# Patient Record
Sex: MALE | Race: WHITE | NOT HISPANIC OR LATINO | Employment: FULL TIME | URBAN - METROPOLITAN AREA
[De-identification: names, ages, dates, MRNs, and addresses within clinical notes are randomized per-mention and may not be internally consistent; named-entity substitution may affect disease eponyms.]

---

## 2017-01-30 DIAGNOSIS — E11.9 TYPE 2 DIABETES MELLITUS WITHOUT COMPLICATION (H): Primary | ICD-10-CM

## 2017-03-24 ENCOUNTER — OFFICE VISIT (OUTPATIENT)
Dept: FAMILY MEDICINE | Facility: CLINIC | Age: 32
End: 2017-03-24
Payer: COMMERCIAL

## 2017-03-24 VITALS
HEIGHT: 72 IN | SYSTOLIC BLOOD PRESSURE: 130 MMHG | HEART RATE: 81 BPM | DIASTOLIC BLOOD PRESSURE: 78 MMHG | BODY MASS INDEX: 24.65 KG/M2 | TEMPERATURE: 97.2 F | WEIGHT: 182 LBS

## 2017-03-24 DIAGNOSIS — E11.9 TYPE 2 DIABETES MELLITUS WITHOUT COMPLICATION, WITHOUT LONG-TERM CURRENT USE OF INSULIN (H): Primary | ICD-10-CM

## 2017-03-24 DIAGNOSIS — Z23 NEED FOR VACCINATION: ICD-10-CM

## 2017-03-24 DIAGNOSIS — Z71.84 COUNSELING ABOUT TRAVEL: ICD-10-CM

## 2017-03-24 LAB — HBA1C MFR BLD: 5.3 % (ref 4.3–6)

## 2017-03-24 PROCEDURE — 90471 IMMUNIZATION ADMIN: CPT | Performed by: FAMILY MEDICINE

## 2017-03-24 PROCEDURE — 99214 OFFICE O/P EST MOD 30 MIN: CPT | Mod: 25 | Performed by: FAMILY MEDICINE

## 2017-03-24 PROCEDURE — 36415 COLL VENOUS BLD VENIPUNCTURE: CPT | Performed by: FAMILY MEDICINE

## 2017-03-24 PROCEDURE — 90746 HEPB VACCINE 3 DOSE ADULT IM: CPT | Performed by: FAMILY MEDICINE

## 2017-03-24 PROCEDURE — 90472 IMMUNIZATION ADMIN EACH ADD: CPT | Performed by: FAMILY MEDICINE

## 2017-03-24 PROCEDURE — 90632 HEPA VACCINE ADULT IM: CPT | Performed by: FAMILY MEDICINE

## 2017-03-24 PROCEDURE — 83036 HEMOGLOBIN GLYCOSYLATED A1C: CPT | Performed by: FAMILY MEDICINE

## 2017-03-24 PROCEDURE — 90691 TYPHOID VACCINE IM: CPT | Performed by: FAMILY MEDICINE

## 2017-03-24 NOTE — MR AVS SNAPSHOT
After Visit Summary   3/24/2017    Jean Lamar    MRN: [id number]           Patient Information     Date Of Birth          1985        Visit Information        Provider Department      3/24/2017 9:00 AM Kilo Montes MD [facility]        Today's Diagnoses     Type 2 diabetes mellitus without complication, without long-term current use of insulin (H)    -  1    Counseling about travel        Need for vaccination           Follow-ups after your visit        Follow-up notes from your care team     Return in about 3 months (around 6/24/2017) for Diabetes Recheck.      Who to contact     If you have questions or need follow up information about today's clinic visit or your schedule please contact [facility] directly at [phone].  Normal or non-critical lab and imaging results will be communicated to you by Think Financehart, letter or phone within 4 business days after the clinic has received the results. If you do not hear from us within 7 days, please contact the clinic through Think Financehart or phone. If you have a critical or abnormal lab result, we will notify you by phone as soon as possible.  Submit refill requests through MySiteApp or call your pharmacy and they will forward the refill request to us. Please allow 3 business days for your refill to be completed.          Additional Information About Your Visit        MyChart Information     MySiteApp gives you secure access to your electronic health record. If you see a primary care provider, you can also send messages to your care team and make appointments. If you have questions, please call your primary care clinic.  If you do not have a primary care provider, please call [phone] and they will assist you.        Care EveryWhere ID     This is your Care EveryWhere ID. This could be used by other organizations to access your [place] medical records  [id number]        Your Vitals Were     Pulse Temperature Height BMI  (Body Mass Index)          81 97.2  F (36.2  C) (Oral) 6' (1.829 m) 24.68 kg/m2         Blood Pressure from Last 3 Encounters:   03/24/17 130/78   12/23/16 132/78   09/19/16 124/78    Weight from Last 3 Encounters:   03/24/17 182 lb (82.6 kg)   12/23/16 188 lb (85.3 kg)   09/19/16 200 lb (90.7 kg)              We Performed the Following     Each additional admin.  (Right click and add QUANTITY)  [[id number]]     Hemoglobin A1c     HEPATITIS A VACCINE (ADULT)     HEPATITIS B VACCINE,ADULT,IM     TYPHOID VACCINE, IM          Today's Medication Changes          These changes are accurate as of: 3/24/17  9:49 AM.  If you have any questions, ask your nurse or doctor.               Start taking these medicines.        Dose/Directions    metFORMIN 1000 MG tablet   Commonly known as:  GLUCOPHAGE   Used for:  Type 2 diabetes mellitus without complication, without long-term current use of insulin (H)   Started by:  Kilo Montes MD        Dose:  500 mg   Take 0.5 tablets (500 mg) by mouth 2 times daily (with meals)   Quantity:  90 tablet   Refills:  0         Stop taking these medicines if you haven't already. Please contact your care team if you have questions.     glimepiride 2 MG tablet   Commonly known as:  AMARYL   Stopped by:  Kilo Montes MD                Where to get your medicines      Some of these will need a paper prescription and others can be bought over the counter.  Ask your nurse if you have questions.     You don't need a prescription for these medications     metFORMIN 1000 MG tablet                Primary Care Provider Office Phone # Fax #    [facility] [phone] [facility] [address]        Thank you!     Thank you for choosing [facility]  for your care. Our goal is always to provide you with excellent care. Hearing back from our patients is one way we can continue to improve our services. Please take a few minutes to complete the  written survey that you may receive in the mail after your visit with us. Thank you!             Your Updated Medication List - Protect others around you: Learn how to safely use, store and throw away your medicines at www.disposemymeds.org.          This list is accurate as of: 3/24/17  9:49 AM.  Always use your most recent med list.                   Brand Name Dispense Instructions for use    ASPIRIN NOT PRESCRIBED    INTENTIONAL     1 each continuous prn for other Antiplatelet medication not prescribed intentionally due to not indicated       metFORMIN 1000 MG tablet    GLUCOPHAGE    90 tablet    Take 0.5 tablets (500 mg) by mouth 2 times daily (with meals)       STATIN NOT PRESCRIBED (INTENTIONAL)      continuous prn for other Statin not prescribed intentionally due to {CHOOSE REASON BEFORE SIGNING!!!:675813}

## 2017-03-24 NOTE — NURSING NOTE
Chief Complaint   Patient presents with     Diabetes       Initial /78 (BP Location: Right arm, Cuff Size: Adult Regular)  Pulse 81  Temp 97.2  F (36.2  C) (Oral)  Ht 6' (1.829 m)  Wt 182 lb (82.6 kg)  BMI 24.68 kg/m2 Estimated body mass index is 24.68 kg/(m^2) as calculated from the following:    Height as of this encounter: 6' (1.829 m).    Weight as of this encounter: 182 lb (82.6 kg).  Medication Reconciliation: sandrine Aaron M.A.

## 2017-03-24 NOTE — PROGRESS NOTES
To access diabetes educational materials with in EPIC use <dot>[email or web address]      Put this in AFTER VISIT SUMMARY if needed for the patient to access information online [email or web address]      SUBJECTIVE:  Jean Lamar is a 31 year old male who presents today for a follow up appointment for management of DIABETES MELLITUS.    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   Results as follows: fasting glucose-  and bedtime-     The patient reports that he IS taking the medication as prescribed. He denies side effects of medication.    ----------------------------------------------------------------------------------------------------------------------------------------    BP Readings from Last 3 Encounters:   03/24/17 130/78   12/23/16 132/78   09/19/16 124/78     The patient reports that he IS NOT currently smoking.  History   Smoking Status     Never Smoker   Smokeless Tobacco     Not on file           The patient reports that he IS NOT taking a statin.   (Reminder all diabetics with a ASCVD risk greater or equal to 7.5% should be on a high intensity statin, otherwise on a moderate intensity statin)  he is not taking a statin because of his age.       The patient reports that he IS NOT taking  aspirin daily.  (Reminder all diabetic patients with a cardiovascular risk factor and > 50 should take a daily aspirin)   he is not taking aspirin because of his age.      The patient reports that he IS doing a self foot exam weekly.  The patient reports that he does exercise in the form of walking 3-4 times a week for about 30 minutes .  The patient reports that he IS following the recommended diabetic diet. He  would give himself a B grade  on his diet.  The patient reports that his last eye exam was 9 months ago.         Immunization History   Administered Date(s) Administered     Hepatitis B 06/10/2016, 12/23/2016     Influenza Vaccine IM 3yrs+ 4 Valent IIV4 12/23/2016     Pneumococcal 23 valent 05/23/2016     TD (ADULT, 7+) 05/23/2016     The patient reports that he has started the hepatitis B vaccine series in the past. (recommended for age 19-59 and can be given to age 60 or older)  The patient reports that he has had a pnuemovax in the past.  The patient reports that he has had a flu shot for the current influenza season.  The patient would like to have a Hepatitis A, Hepatitis B and Typhoid as he is traveling to [facility]        EXAM:  /78 (BP Location: Right arm, Cuff Size: Adult Regular)  Pulse 81  Temp 97.2  F (36.2  C) (Oral)  Ht 6' (1.829 m)  Wt 182 lb (82.6 kg)  BMI 24.68 kg/m2  Wt Readings from Last 4 Encounters:   03/24/17 182 lb (82.6 kg)   12/23/16 188 lb (85.3 kg)   09/19/16 200 lb (90.7 kg)   07/21/16 214 lb 3.2 oz (97.2 kg)     Body mass index is 24.68 kg/(m^2).    General:  Jean is awake, alert, and cooperative.  NAD.                                             Foot Exam: Areas of numbess as outlined above  Right Foot none  Left Foot none  normal DP pulses, capillary refill less than 2 seconds, no trophic changes or ulcerative lesions, dry skin globally on the feet and normal monofilament exam, except for findings noted on diabetic foot graphical image.                Previsit labs drawn include:  Office Visit on 12/23/2016   Component Date Value Ref Range Status     Cholesterol 12/23/2016 127  <200 mg/dL Final     Triglycerides 12/23/2016 55  <150 mg/dL Final    Fasting specimen     HDL Cholesterol 12/23/2016 43  >39 mg/dL Final     LDL Cholesterol Calculated 12/23/2016 73  <100 mg/dL Final    Desirable:       <100 mg/dl     Non HDL Cholesterol 12/23/2016 84  <130 mg/dL Final     Hemoglobin A1C 12/23/2016 4.7  4.3 -  "6.0 % Final     ASSESSMENT and PLAN:    Type II Diabetes, Unchanged  Stable off of amaryl    DIABETES Type II:          Results for orders placed or performed in visit on 03/24/17   Hemoglobin A1c   Result Value Ref Range    Hemoglobin A1C 5.3 4.3 - 6.0 %                      Lab Results   Component Value Date    A1C 4.7 12/23/2016       Control   good  Lab Results   Component Value Date    A1C 4.7 12/23/2016    A1C 5.2 09/18/2016    A1C 13.3 06/07/2016     Lab Results   Component Value Date    MICROL 24 06/07/2016     No results found for: MICROALBUMIN Control   good    Recommended to take ASA  mg daily for appropriate patient, Decrease the dose of Metformin to 500 mg 2 time(s) day, Compliance with the recommended diabetic diet was stressed, Regular aerobic exercise was encouraged, Home glucose monitoring encouraged, Annual eye exam recommended, Self foot exam demonstrated and recommended to be done nightly, Apply moisturizer to feet with in 3 minutes of showering or bathing, Follow up in clinic in 3 months for a diabetes check and Future labs ordered and the patient was instructed to make a lab appt 1 week prior to next diabetes visit      BP/HTN:   BP Readings from Last 3 Encounters:   03/24/17 130/78   12/23/16 132/78   09/19/16 124/78     Control   good    - Medication:continue the current doses of medication.  (make sure to order \"Ace not prescribed intentionally if not on an ACE/ARB)  The patient was advised to do the following therapuetic life style changes  - Dietary sodium restriction and increase potassium and Calcium intake  - Regular aerobic exercise  - Weight loss  - Discontinue smoking if applicable  - Avoid regular NSAID use if applicable  - Avoid regular decongestant use if applicable  - Follow up in clinic in 6 months for a recheck  - Check a basic metabolic panel today if needed    Patient Education: Reviewed risks of hypertension and principles of   Treatment.    HYPERCHOLESTEROLEMIA:   "   The ASCVD Risk score (Bradley AALIYAH Dang, et al., 2013) failed to calculate for the following reasons:    The 2013 ASCVD risk score is only valid for ages 40 to 79    Lab Results   Component Value Date    LDL 73 12/23/2016      Control   good  Cholesterol   Date Value Ref Range Status   12/23/2016 127 <200 mg/dL Final   09/18/2016 136 <200 mg/dL Final     HDL Cholesterol   Date Value Ref Range Status   12/23/2016 43 >39 mg/dL Final   09/18/2016 38 (L) >39 mg/dL Final     LDL Cholesterol Calculated   Date Value Ref Range Status   12/23/2016 73 <100 mg/dL Final     Comment:     Desirable:       <100 mg/dl   09/18/2016 80 <100 mg/dL Final     Comment:     Desirable:       <100 mg/dl     Triglycerides   Date Value Ref Range Status   12/23/2016 55 <150 mg/dL Final     Comment:     Fasting specimen   09/18/2016 89 <150 mg/dL Final     Comment:     Fasting specimen     No results found for: CHOLHDLRATIO        We have discussed the results and we will continue the current management.       Screening Questionnaire for Adult Immunization    Are you sick today?   No   Do you have allergies to medications, food, a vaccine component or latex?   Yes   Have you ever had a serious reaction after receiving a vaccination?   No   Do you have a long-term health problem with heart disease, lung disease, asthma, kidney disease, metabolic disease (e.g. diabetes), anemia, or other blood disorder?   Yes   Do you have cancer, leukemia, HIV/AIDS, or any other immune system problem?   No   In the past 3 months, have you taken medications that affect  your immune system, such as prednisone, other steroids, or anticancer drugs; drugs for the treatment of rheumatoid arthritis, Crohn s disease, or psoriasis; or have you had radiation treatments?   No   Have you had a seizure, or a brain or other nervous system problem?   No   During the past year, have you received a transfusion of blood or blood     products, or been given immune (gamma) globulin or  antiviral drug?   No   For women: Are you pregnant or is there a chance you could become        pregnant during the next month?   No   Have you received any vaccinations in the past 4 weeks?   No     Immunization questionnaire was positive for at least one answer.  Notified Dr. Montes.      [facility]    Screening performed by Helen Aaron on 3/24/2017 at 9:50 AM.

## 2017-06-20 ENCOUNTER — OFFICE VISIT (OUTPATIENT)
Dept: FAMILY MEDICINE | Facility: CLINIC | Age: 32
End: 2017-06-20
Payer: COMMERCIAL

## 2017-06-20 VITALS
HEIGHT: 72 IN | WEIGHT: 193 LBS | SYSTOLIC BLOOD PRESSURE: 110 MMHG | BODY MASS INDEX: 26.14 KG/M2 | HEART RATE: 88 BPM | DIASTOLIC BLOOD PRESSURE: 76 MMHG | TEMPERATURE: 97.1 F

## 2017-06-20 DIAGNOSIS — E11.9 TYPE 2 DIABETES MELLITUS WITHOUT COMPLICATION, WITHOUT LONG-TERM CURRENT USE OF INSULIN (H): ICD-10-CM

## 2017-06-20 LAB
CHOLEST SERPL-MCNC: 159 MG/DL
CREAT UR-MCNC: 474 MG/DL
HBA1C MFR BLD: 5.3 % (ref 4.3–6)
HDLC SERPL-MCNC: 53 MG/DL
LDLC SERPL CALC-MCNC: 93 MG/DL
MICROALBUMIN UR-MCNC: 28 MG/L
MICROALBUMIN/CREAT UR: 5.8 MG/G CR (ref 0–17)
NONHDLC SERPL-MCNC: 106 MG/DL
TRIGL SERPL-MCNC: 67 MG/DL

## 2017-06-20 PROCEDURE — 83036 HEMOGLOBIN GLYCOSYLATED A1C: CPT | Performed by: FAMILY MEDICINE

## 2017-06-20 PROCEDURE — 99214 OFFICE O/P EST MOD 30 MIN: CPT | Performed by: FAMILY MEDICINE

## 2017-06-20 PROCEDURE — 80061 LIPID PANEL: CPT | Performed by: FAMILY MEDICINE

## 2017-06-20 PROCEDURE — 36415 COLL VENOUS BLD VENIPUNCTURE: CPT | Performed by: FAMILY MEDICINE

## 2017-06-20 PROCEDURE — 82043 UR ALBUMIN QUANTITATIVE: CPT | Performed by: FAMILY MEDICINE

## 2017-06-20 NOTE — NURSING NOTE
Chief Complaint   Patient presents with     Diabetes       Initial /73  Pulse 88  Temp 97.1  F (36.2  C) (Oral)  Ht 6' (1.829 m)  Wt 193 lb (87.5 kg)  BMI 26.18 kg/m2 Estimated body mass index is 26.18 kg/(m^2) as calculated from the following:    Height as of this encounter: 6' (1.829 m).    Weight as of this encounter: 193 lb (87.5 kg).  Medication Reconciliation: complete   Helen Aaron M.A.

## 2017-06-20 NOTE — MR AVS SNAPSHOT
After Visit Summary   6/20/2017    Jean Lamar    MRN: [id number]           Patient Information     Date Of Birth          1985        Visit Information        Provider Department      6/20/2017 10:15 AM Kilo Montes MD [facility]        Today's Diagnoses     Type 2 diabetes mellitus without complication, without long-term current use of insulin (H)          Care Instructions    Please schedule and eye exam with you eye care provider and continue to do so every 1 year(s).      RETURN TO CLINIC FOR the Hepatitis A vaccine on 9-24-17 or after          Follow-ups after your visit        Follow-up notes from your care team     Return in about 3 months (around 9/20/2017) for Diabetes Recheck.      Who to contact     If you have questions or need follow up information about today's clinic visit or your schedule please contact [facility] directly at [phone].  Normal or non-critical lab and imaging results will be communicated to you by MyChart, letter or phone within 4 business days after the clinic has received the results. If you do not hear from us within 7 days, please contact the clinic through GoPlanithart or phone. If you have a critical or abnormal lab result, we will notify you by phone as soon as possible.  Submit refill requests through enercast or call your pharmacy and they will forward the refill request to us. Please allow 3 business days for your refill to be completed.          Additional Information About Your Visit        MyChart Information     enercast gives you secure access to your electronic health record. If you see a primary care provider, you can also send messages to your care team and make appointments. If you have questions, please call your primary care clinic.  If you do not have a primary care provider, please call [phone] and they will assist you.        Care EveryWhere ID     This is your Care EveryWhere ID. This could be used by  other organizations to access your [place] medical records  [id number]        Your Vitals Were     Pulse Temperature Height BMI (Body Mass Index)          88 97.1  F (36.2  C) (Oral) 6' (1.829 m) 26.18 kg/m2         Blood Pressure from Last 3 Encounters:   06/20/17 110/76   03/24/17 130/78   12/23/16 132/78    Weight from Last 3 Encounters:   06/20/17 193 lb (87.5 kg)   03/24/17 182 lb (82.6 kg)   12/23/16 188 lb (85.3 kg)              We Performed the Following     Albumin Random Urine Quantitative     Hemoglobin A1c     Lipid panel reflex to direct LDL          Today's Medication Changes          These changes are accurate as of: 6/20/17 11:01 AM.  If you have any questions, ask your nurse or doctor.               Stop taking these medicines if you haven't already. Please contact your care team if you have questions.     metFORMIN 1000 MG tablet   Commonly known as:  GLUCOPHAGE   Stopped by:  Kilo Montes MD                    Primary Care Provider Office Phone # Fax #    Kilo Montes [phone]       [facility] [id number] [place] [id number]        Thank you!     Thank you for choosing [facility]  for your care. Our goal is always to provide you with excellent care. Hearing back from our patients is one way we can continue to improve our services. Please take a few minutes to complete the written survey that you may receive in the mail after your visit with us. Thank you!             Your Updated Medication List - Protect others around you: Learn how to safely use, store and throw away your medicines at www.disposemymeds.org.          This list is accurate as of: 6/20/17 11:01 AM.  Always use your most recent med list.                   Brand Name Dispense Instructions for use    ASPIRIN NOT PRESCRIBED    INTENTIONAL     1 each continuous prn for other Antiplatelet medication not prescribed intentionally due to not indicated       STATIN NOT  PRESCRIBED (INTENTIONAL)      continuous prn for other Statin not prescribed intentionally due to {CHOOSE REASON BEFORE SIGNING!!!:426336}

## 2017-06-20 NOTE — PATIENT INSTRUCTIONS
Please schedule and eye exam with you eye care provider and continue to do so every 1 year(s).      RETURN TO CLINIC FOR the Hepatitis A vaccine on 9-24-17 or after

## 2017-06-20 NOTE — PROGRESS NOTES
To access diabetes educational materials with in EPIC use <dot>[email or web address]      Put this in AFTER VISIT SUMMARY if needed for the patient to access information online [email or web address]      SUBJECTIVE:  Jean Lamar is a 32 year old male who presents today for a follow up appointment for management of DIABETES MELLITUS.    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   Results as follows: fasting glucose-  and bedtime-     The patient reports that he IS taking the medication as prescribed. He denies side effects of medication.    ----------------------------------------------------------------------------------------------------------------------------------------    BP Readings from Last 3 Encounters:   06/20/17 110/76   03/24/17 130/78   12/23/16 132/78     The patient reports that he IS NOT currently smoking.  History   Smoking Status     Never Smoker   Smokeless Tobacco     Not on file           The ASCVD Risk score (Warren DC Jr, et al., 2013) failed to calculate for the following reasons:    The 2013 ASCVD risk score is only valid for ages 40 to 79          The patient reports that he IS NOT taking a statin.   (Reminder all diabetics with a ASCVD risk greater or equal to 7.5% should be on a high intensity statin, otherwise on a moderate intensity statin)      The patient reports that he IS NOT taking  aspirin daily.  (Reminder all diabetic patients with a cardiovascular risk factor and > 50 should take a daily aspirin)     The patient reports that he IS doing a self foot exam weekly.  The patient reports that he does exercise in the form of walking 3 times a week for about 10-15  minutes .  The patient reports that he IS  following the recommended diabetic diet. He  would give himself a B grade on his diet.  The patient reports that his last eye exam was 12 months ago.       Immunization History   Administered Date(s) Administered     Hepatitis A (Adult) 03/24/2017     Hepatitis B 06/10/2016, 12/23/2016, 03/24/2017     Influenza Vaccine IM 3yrs+ 4 Valent IIV4 12/23/2016     Pneumococcal 23 valent 05/23/2016     TD (ADULT, 7+) 05/23/2016     Typhoid IM 03/24/2017     The patient reports that he has had the hepatitis B vaccine series in the past. (recommended for age 19-59 and can be given to age 60 or older)  The patient reports that he has had a pnuemovax in the past.  The patient reports that he has had a flu shot for the current influenza season.  The patient would like to have a no vaccinations today  Hep A can be given 6 month(s) from 3-24-17    Patient concerns: has no specific complaints and has been feeling well.        EXAM:  /76  Pulse 88  Temp 97.1  F (36.2  C) (Oral)  Ht 6' (1.829 m)  Wt 193 lb (87.5 kg)  BMI 26.18 kg/m2  Wt Readings from Last 4 Encounters:   06/20/17 193 lb (87.5 kg)   03/24/17 182 lb (82.6 kg)   12/23/16 188 lb (85.3 kg)   09/19/16 200 lb (90.7 kg)     Body mass index is 26.18 kg/(m^2).    General:  Jean is awake, alert, and cooperative.  NAD.        Diabetic Foot Screen:  Any complaints of increased pain or numbness ? No  Is there a foot ulcer now or a history of foot ulcer? No  Does the foot have an abnormal shape? No  Are the nails thick, too long or ingrown? No  Are there any redness or open areas? No         Sensation Testing done at all points on the diagram with monofilament     Right Foot: Sensation Normal at all points  Left Foot: Sensation Normal at all points     Risk Category: 0- No loss of protective sensation  Performed by Kilo Montes MD                  Previsit labs drawn include:  Office Visit on 03/24/2017   Component Date Value Ref Range Status     Hemoglobin A1C  "03/24/2017 5.3  4.3 - 6.0 % Final         ASSESSMENT and PLAN:    Type II Diabetes, Improving as he is on a lower dose of the metformin since his last hemoglobin A1C   DIABETES Type II:             Results for orders placed or performed in visit on 06/20/17   Hemoglobin A1c   Result Value Ref Range    Hemoglobin A1C 5.3 4.3 - 6.0 %                     Lab Results   Component Value Date    A1C 5.3 03/24/2017         Lab Results   Component Value Date    A1C 5.3 03/24/2017    A1C 4.7 12/23/2016    A1C 5.2 09/18/2016     Lab Results   Component Value Date    MICROL 24 06/07/2016     No results found for: MICROALBUMIN Control   good    Discontinue the metformin, Compliance with the recommended diabetic diet was stressed, Regular aerobic exercise was encouraged, Home glucose monitoring encouraged, Annual eye exam recommended, Self foot exam demonstrated and recommended to be done nightly, Apply moisturizer to feet with in 3 minutes of showering or bathing, Follow up in clinic in 3 months for a diabetes check and Future labs ordered and the patient was instructed to make a lab appt 1 week prior to next diabetes visit      BP/HTN:   BP Readings from Last 3 Encounters:   06/20/17 110/76   03/24/17 130/78   12/23/16 132/78     Control   good    - Medication:continue the current doses of medication.  (make sure to order \"Ace not prescribed intentionally if not on an ACE/ARB)  The patient was advised to do the following therapuetic life style changes  - Dietary sodium restriction and increase potassium and Calcium intake  - Regular aerobic exercise  - Weight loss  - Discontinue smoking if applicable  - Avoid regular NSAID use if applicable  - Avoid regular decongestant use if applicable  - Follow up in clinic in 6 months for a recheck  - Check a basic metabolic panel today if needed    Patient Education: Reviewed risks of hypertension and principles of   Treatment.    HYPERCHOLESTEROLEMIA:     The ASCVD Risk score (Edin FISCHER Jr, " et al., 2013) failed to calculate for the following reasons:    The 2013 ASCVD risk score is only valid for ages 40 to 79    Lab Results   Component Value Date    LDL 73 12/23/2016      Control   good  Cholesterol   Date Value Ref Range Status   12/23/2016 127 <200 mg/dL Final   09/18/2016 136 <200 mg/dL Final     HDL Cholesterol   Date Value Ref Range Status   12/23/2016 43 >39 mg/dL Final   09/18/2016 38 (L) >39 mg/dL Final     LDL Cholesterol Calculated   Date Value Ref Range Status   12/23/2016 73 <100 mg/dL Final     Comment:     Desirable:       <100 mg/dl   09/18/2016 80 <100 mg/dL Final     Comment:     Desirable:       <100 mg/dl     Triglycerides   Date Value Ref Range Status   12/23/2016 55 <150 mg/dL Final     Comment:     Fasting specimen   09/18/2016 89 <150 mg/dL Final     Comment:     Fasting specimen     No results found for: CHOLHDLRATIO      The patient is not on a statin because of his age.         The patient is fasting and we will recheck the fasting lipid panel .

## 2017-06-21 NOTE — PROGRESS NOTES
Jean,  I have reviewed the results of the laboratory tests that we recently ordered. All of the lab work performed was normal or considered normal for you.  Sincerely,   Kilo Montes

## 2017-07-14 ENCOUNTER — TELEPHONE (OUTPATIENT)
Dept: FAMILY MEDICINE | Facility: CLINIC | Age: 32
End: 2017-07-14

## 2017-07-14 DIAGNOSIS — E11.9 TYPE 2 DIABETES MELLITUS WITHOUT COMPLICATION, WITHOUT LONG-TERM CURRENT USE OF INSULIN (H): Primary | ICD-10-CM

## 2017-07-14 NOTE — TELEPHONE ENCOUNTER
Per 6/20/17 office notes with PCP, patient has been ordered to check BS twice daily:        The patient checks his blood sugar as follows: two times daily, once daily.   Results as follows: fasting glucose-  and bedtime-        Rx refilled per Richelle RN refill protocol.    Mandy Ahn RN

## 2017-07-14 NOTE — TELEPHONE ENCOUNTER
Patient needs his Contour test strips refilled. Qty 100 testing 4xs daily. He would like 90 day refill. Ok to leave a message, as he is a  and may not be able to answer.

## 2017-07-17 ENCOUNTER — TELEPHONE (OUTPATIENT)
Dept: FAMILY MEDICINE | Facility: CLINIC | Age: 32
End: 2017-07-17

## 2017-07-17 NOTE — TELEPHONE ENCOUNTER
Pt. Is still waiting on a response for a request for his diabetic test strips. [facility] says they have sent over many requested via fax and phone. Please phone him to let him know what can be done.    Thank you

## 2017-07-17 NOTE — TELEPHONE ENCOUNTER
Per pharmacy tech (female)  His test strips have been ready for him since Friday.  I did call patient and let him know this.  Amber Palumbo RN

## 2017-09-29 ENCOUNTER — OFFICE VISIT (OUTPATIENT)
Dept: FAMILY MEDICINE | Facility: CLINIC | Age: 32
End: 2017-09-29
Payer: COMMERCIAL

## 2017-09-29 VITALS
HEART RATE: 92 BPM | DIASTOLIC BLOOD PRESSURE: 78 MMHG | BODY MASS INDEX: 26.99 KG/M2 | SYSTOLIC BLOOD PRESSURE: 115 MMHG | WEIGHT: 199 LBS | TEMPERATURE: 98.2 F

## 2017-09-29 DIAGNOSIS — E78.6 HDL DEFICIENCY: ICD-10-CM

## 2017-09-29 DIAGNOSIS — Z23 NEED FOR VACCINATION: ICD-10-CM

## 2017-09-29 DIAGNOSIS — E11.9 TYPE 2 DIABETES MELLITUS WITHOUT COMPLICATION, WITHOUT LONG-TERM CURRENT USE OF INSULIN (H): Primary | ICD-10-CM

## 2017-09-29 DIAGNOSIS — E11.9 TYPE 2 DIABETES MELLITUS WITHOUT COMPLICATION, WITHOUT LONG-TERM CURRENT USE OF INSULIN (H): ICD-10-CM

## 2017-09-29 DIAGNOSIS — Z23 NEED FOR PROPHYLACTIC VACCINATION AND INOCULATION AGAINST INFLUENZA: Primary | ICD-10-CM

## 2017-09-29 LAB
ALBUMIN SERPL-MCNC: 4.1 G/DL (ref 3.4–5)
ALP SERPL-CCNC: 60 U/L (ref 40–150)
ALT SERPL W P-5'-P-CCNC: 25 U/L (ref 0–70)
ANION GAP SERPL CALCULATED.3IONS-SCNC: 7 MMOL/L (ref 3–14)
AST SERPL W P-5'-P-CCNC: 11 U/L (ref 0–45)
BILIRUB SERPL-MCNC: 0.6 MG/DL (ref 0.2–1.3)
BUN SERPL-MCNC: 14 MG/DL (ref 7–30)
CALCIUM SERPL-MCNC: 9.2 MG/DL (ref 8.5–10.1)
CHLORIDE SERPL-SCNC: 105 MMOL/L (ref 94–109)
CHOLEST SERPL-MCNC: 121 MG/DL
CO2 SERPL-SCNC: 29 MMOL/L (ref 20–32)
CREAT SERPL-MCNC: 1.17 MG/DL (ref 0.66–1.25)
GFR SERPL CREATININE-BSD FRML MDRD: 71 ML/MIN/1.7M2
GLUCOSE SERPL-MCNC: 99 MG/DL (ref 70–99)
HBA1C MFR BLD: 5.4 % (ref 4.3–6)
HDLC SERPL-MCNC: 46 MG/DL
LDLC SERPL CALC-MCNC: 65 MG/DL
NONHDLC SERPL-MCNC: 75 MG/DL
POTASSIUM SERPL-SCNC: 4.3 MMOL/L (ref 3.4–5.3)
PROT SERPL-MCNC: 7.3 G/DL (ref 6.8–8.8)
SODIUM SERPL-SCNC: 141 MMOL/L (ref 133–144)
TRIGL SERPL-MCNC: 49 MG/DL

## 2017-09-29 PROCEDURE — 90632 HEPA VACCINE ADULT IM: CPT | Performed by: FAMILY MEDICINE

## 2017-09-29 PROCEDURE — 80053 COMPREHEN METABOLIC PANEL: CPT | Performed by: FAMILY MEDICINE

## 2017-09-29 PROCEDURE — 80061 LIPID PANEL: CPT | Performed by: FAMILY MEDICINE

## 2017-09-29 PROCEDURE — 83036 HEMOGLOBIN GLYCOSYLATED A1C: CPT | Performed by: FAMILY MEDICINE

## 2017-09-29 PROCEDURE — 36415 COLL VENOUS BLD VENIPUNCTURE: CPT | Performed by: FAMILY MEDICINE

## 2017-09-29 PROCEDURE — 90472 IMMUNIZATION ADMIN EACH ADD: CPT | Performed by: FAMILY MEDICINE

## 2017-09-29 PROCEDURE — 90471 IMMUNIZATION ADMIN: CPT | Performed by: FAMILY MEDICINE

## 2017-09-29 PROCEDURE — 99214 OFFICE O/P EST MOD 30 MIN: CPT | Mod: 25 | Performed by: FAMILY MEDICINE

## 2017-09-29 PROCEDURE — 90686 IIV4 VACC NO PRSV 0.5 ML IM: CPT | Performed by: FAMILY MEDICINE

## 2017-09-29 RX ORDER — FEXOFENADINE HCL 180 MG/1
180 TABLET ORAL DAILY
COMMUNITY

## 2017-09-29 NOTE — PROGRESS NOTES
"Jean,  I have reviewed the results of the laboratory tests that we recently ordered. All of the lab work performed was normal or considered normal for you. Your cholesterol looks great so there is no need to start a \"statin\" at this point.  Sincerely,   Kilo Montes    "

## 2017-09-29 NOTE — PROGRESS NOTES
Injectable Influenza Immunization Documentation    1.  Is the person to be vaccinated sick today?   No    2. Does the person to be vaccinated have an allergy to a component   of the vaccine?   No    3. Has the person to be vaccinated ever had a serious reaction   to influenza vaccine in the past?   No    4. Has the person to be vaccinated ever had Guillain-Barré syndrome?   No    Form completed by Helen Aaron M.A.  --------------------------------------------------------------------------------------------------------------------------------------  To access diabetes educational materials with in EPIC use <dot>[email or web address]      Put this in AFTER VISIT SUMMARY if needed for the patient to access information online [email or web address]      SUBJECTIVE:  Jean Lamar is a 32 year old male who presents today for a follow up appointment for management of DIABETES MELLITUS.    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   Results as follows: fasting glucose-  and bedtime-     The patient reports that he IS taking the medication as prescribed. N/a  ----------------------------------------------------------------------------------------------------------------------------------------    BP Readings from Last 3 Encounters:   09/29/17 115/78   06/20/17 110/76   03/24/17 130/78     The patient reports that he IS NOT currently smoking.  History   Smoking Status     Never Smoker   Smokeless Tobacco     Never Used         The ASCVD Risk score (High Fallsshelley FISCHER Jr, et al., 2013) failed to calculate for the following reasons:    The 2013 ASCVD risk score is only valid for ages 40 to 79            The patient reports that he IS NOT taking a statin.    (Reminder all diabetics with a ASCVD risk greater or equal to 7.5% should be on a high intensity statin, otherwise on a moderate intensity statin)    The patient reports that he IS not taking  aspirin daily.  (Reminder all diabetic patients with a cardiovascular risk factor and > 50 should take a daily aspirin)   he is not taking aspirin because it is not indicated at his age.  (Reminder to intentionally not prescribe aspirin in )    The patient reports that he IS doing a self foot exam weekly.  The patient reports that he does exercise occasionally.   The patient reports that he IS following the recommended diabetic diet.   The patient reports that his last eye exam was 15 months ago.       Immunization History   Administered Date(s) Administered     HepA-Adult 03/24/2017     HepB 06/10/2016, 12/23/2016, 03/24/2017     Influenza Vaccine IM 3yrs+ 4 Valent IIV4 12/23/2016     Pneumococcal 23 valent 05/23/2016     TD (ADULT, 7+) 05/23/2016     Typhoid IM 03/24/2017     The patient reports that he has had the hepatitis B vaccine series in the past. (recommended for age 19-59 and can be given to age 60 or older)  The patient reports that he has had a pnuemovax in the past.  The patient reports that he has not had a flu shot for the current influenza season.  The patient would like to have a Hepatitis A #2 and Influenza    Patient concerns: has no specific complaints and has been feeling well.      EXAM:  /78  Pulse 92  Temp 98.2  F (36.8  C) (Oral)  Wt 199 lb (90.3 kg)  BMI 26.99 kg/m2  Wt Readings from Last 4 Encounters:   09/29/17 199 lb (90.3 kg)   06/20/17 193 lb (87.5 kg)   03/24/17 182 lb (82.6 kg)   12/23/16 188 lb (85.3 kg)     Body mass index is 26.99 kg/(m^2).    General:  Jean is awake, alert, and cooperative.  NAD.        Diabetic Foot Screen:  Any complaints of increased pain or numbness ? No  Is there a foot ulcer now or a history of foot ulcer? No  Does the foot have an abnormal  "shape? No  Are the nails thick, too long or ingrown? No  Are there any redness or open areas? No         Sensation Testing done at all points on the diagram with monofilament     Right Foot: Sensation Normal at all points  Left Foot: Sensation Normal at all points     Risk Category: 0- No loss of protective sensation  Performed by Kilo Montes MD                  Results for orders placed or performed in visit on 09/29/17   Hemoglobin A1c   Result Value Ref Range    Hemoglobin A1C 5.4 4.3 - 6.0 %         ASSESSMENT and PLAN:    Type II Diabetes, Markedly improving.    DIABETES Type II:                       Lab Results   Component Value Date    A1C 5.3 06/20/2017       Control   good  Lab Results   Component Value Date    A1C 5.3 06/20/2017    A1C 5.3 03/24/2017    A1C 4.7 12/23/2016     Lab Results   Component Value Date    MICROL 28 06/20/2017     No results found for: MICROALBUMIN Control   good    Compliance with the recommended diabetic diet was stressed, Regular aerobic exercise was encouraged, Home glucose monitoring encouraged, Annual eye exam recommended, Self foot exam demonstrated and recommended to be done nightly, Apply moisturizer to feet with in 3 minutes of showering or bathing, Follow up in clinic in 6 months for a diabetes check and Future labs ordered and the patient was instructed to make a lab appt 1 week prior to next diabetes visit      BP/HTN:   BP Readings from Last 3 Encounters:   09/29/17 115/78   06/20/17 110/76   03/24/17 130/78     Control   good    - Medication:N/A  (make sure to order \"Ace not prescribed intentionally if not on an ACE/ARB)  The patient was advised to do the following therapuetic life style changes  - Dietary sodium restriction and increase potassium and Calcium intake  - Regular aerobic exercise  - Weight loss  - Discontinue smoking if applicable  - Avoid regular NSAID use if applicable  - Avoid regular decongestant use if applicable  - Follow up in clinic in 6 " months for a recheck  - Check a basic metabolic panel today if needed    Patient Education: Reviewed risks of hypertension and principles of   Treatment.    HYPERCHOLESTEROLEMIA:     The ASCVD Risk score (Edin AALIYAH Jr, et al., 2013) failed to calculate for the following reasons:    The 2013 ASCVD risk score is only valid for ages 40 to 79    Lab Results   Component Value Date    LDL 93 06/20/2017      Control   fair  Cholesterol   Date Value Ref Range Status   06/20/2017 159 <200 mg/dL Final   12/23/2016 127 <200 mg/dL Final     HDL Cholesterol   Date Value Ref Range Status   06/20/2017 53 >39 mg/dL Final   12/23/2016 43 >39 mg/dL Final     LDL Cholesterol Calculated   Date Value Ref Range Status   06/20/2017 93 <100 mg/dL Final     Comment:     Desirable:       <100 mg/dl   12/23/2016 73 <100 mg/dL Final     Comment:     Desirable:       <100 mg/dl     Triglycerides   Date Value Ref Range Status   06/20/2017 67 <150 mg/dL Final     Comment:     Fasting specimen   12/23/2016 55 <150 mg/dL Final     Comment:     Fasting specimen     No results found for: CHOLHDLRATIO        The patient is fasting and we will recheck the fasting lipid panel .       Screening Questionnaire for Adult Immunization    Are you sick today?   No   Do you have allergies to medications, food, a vaccine component or latex?   No   Have you ever had a serious reaction after receiving a vaccination?   No   Do you have a long-term health problem with heart disease, lung disease, asthma, kidney disease, metabolic disease (e.g. diabetes), anemia, or other blood disorder?   No   Do you have cancer, leukemia, HIV/AIDS, or any other immune system problem?   No   In the past 3 months, have you taken medications that affect  your immune system, such as prednisone, other steroids, or anticancer drugs; drugs for the treatment of rheumatoid arthritis, Crohn s disease, or psoriasis; or have you had radiation treatments?   No   Have you had a seizure, or a brain  or other nervous system problem?   No   During the past year, have you received a transfusion of blood or blood     products, or been given immune (gamma) globulin or antiviral drug?   No   For women: Are you pregnant or is there a chance you could become        pregnant during the next month?   No   Have you received any vaccinations in the past 4 weeks?   No     Immunization questionnaire answers were all negative.        Per orders of Dr. Montes, injection of Hep A given by Helen Aaron. Patient instructed to remain in clinic for 15 minutes afterwards, and to report any adverse reaction to me immediately.       Screening performed by Helen Aaron on 9/29/2017 at 12:29 PM.    Prior to injection verified patient identity using patient's name and date of birth.

## 2017-09-29 NOTE — NURSING NOTE
Chief Complaint   Patient presents with     Diabetes       Initial /85  Pulse 92  Temp 98.2  F (36.8  C) (Oral)  Wt 199 lb (90.3 kg)  BMI 26.99 kg/m2 Estimated body mass index is 26.99 kg/(m^2) as calculated from the following:    Height as of 6/20/17: 6' (1.829 m).    Weight as of this encounter: 199 lb (90.3 kg).  Medication Reconciliation: sandrine Aaron M.A.

## 2017-09-29 NOTE — MR AVS SNAPSHOT
After Visit Summary   9/29/2017    Jean Lamar    MRN: [id number]           Patient Information     Date Of Birth          1985        Visit Information        Provider Department      9/29/2017 9:00 AM Kilo Montes MD [facility]        Today's Diagnoses     Need for prophylactic vaccination and inoculation against influenza    -  1    Type 2 diabetes mellitus without complication, without long-term current use of insulin (H)        HDL deficiency        Need for vaccination          Care Instructions    Please schedule and eye exam with you eye care provider and continue to do so every 1 year(s).            Follow-ups after your visit        Who to contact     If you have questions or need follow up information about today's clinic visit or your schedule please contact [facility] [place] [facility] directly at [phone].  Normal or non-critical lab and imaging results will be communicated to you by MyChart, letter or phone within 4 business days after the clinic has received the results. If you do not hear from us within 7 days, please contact the clinic through [employer] or phone. If you have a critical or abnormal lab result, we will notify you by phone as soon as possible.  Submit refill requests through [employer] or call your pharmacy and they will forward the refill request to us. Please allow 3 business days for your refill to be completed.          Additional Information About Your Visit        MyChart Information     [employer] gives you secure access to your electronic health record. If you see a primary care provider, you can also send messages to your care team and make appointments. If you have questions, please call your primary care clinic.  If you do not have a primary care provider, please call [phone] and they will assist you.        Care EveryWhere ID     This is your Care EveryWhere ID. This could be used by other organizations to access your [facility]  records  [id number]        Your Vitals Were     Pulse Temperature BMI (Body Mass Index)             92 98.2  F (36.8  C) (Oral) 26.99 kg/m2          Blood Pressure from Last 3 Encounters:   09/29/17 115/78   06/20/17 110/76   03/24/17 130/78    Weight from Last 3 Encounters:   09/29/17 199 lb (90.3 kg)   06/20/17 193 lb (87.5 kg)   03/24/17 182 lb (82.6 kg)              We Performed the Following     Comprehensive metabolic panel     Each additional admin.  (Right click and add QUANTITY)  [95009]     FLU VAC, SPLIT VIRUS IM > 3 YO (QUADRIVALENT) [92196]     Hemoglobin A1c     HEPATITIS A VACCINE, ADULT  [83219]     Lipid panel reflex to direct LDL     Vaccine Administration, Initial [52078]        Primary Care Provider Office Phone # Fax #    Kilo Montes [phone] [id number] [facility] [address]        Equal Access to Services     CLIFF BARONE : Hadii aad ku hadasho Soomaali, waaxda luqadaha, qaybta kaalmada adeegyada, waxay idiin hayaan laureneg gisele quintanilla . So [facility] [phone].    ATENCIÓN: Si habla español, tiene a rodriguez disposición servicios gratuitos de asistencia lingüística. Llame al [phone].    We comply with applicable federal civil rights laws and [place] laws. We do not discriminate on the basis of race, color, national origin, age, disability sex, sexual orientation or gender identity.            Thank you!     Thank you for choosing [facility]  for your care. Our goal is always to provide you with excellent care. Hearing back from our patients is one way we can continue to improve our services. Please take a few minutes to complete the written survey that you may receive in the mail after your visit with us. Thank you!             Your Updated Medication List - Protect others around you: Learn how to safely use, store and throw away your medicines at www.disposemymeds.org.          This list is accurate as of: 9/29/17 10:52 AM.  Always use  your most recent med list.                   Brand Name Dispense Instructions for use Diagnosis    ALLEGRA ALLERGY 180 MG tablet   Generic drug:  fexofenadine      Take 180 mg by mouth daily        ASPIRIN NOT PRESCRIBED    INTENTIONAL     1 each continuous prn for other Antiplatelet medication not prescribed intentionally due to not indicated    Type 2 diabetes mellitus without complication (H)       blood glucose monitoring test strip    SHAILESH CONTOUR    100 strip    Use to test blood sugars 2 times daily or as directed.    Type 2 diabetes mellitus without complication, without long-term current use of insulin (H)       STATIN NOT PRESCRIBED (INTENTIONAL)      continuous prn for other Statin not prescribed intentionally due to {CHOOSE REASON BEFORE SIGNING!!!:050726}

## 2018-03-05 ENCOUNTER — OFFICE VISIT (OUTPATIENT)
Dept: FAMILY MEDICINE | Facility: CLINIC | Age: 33
End: 2018-03-05
Payer: COMMERCIAL

## 2018-03-05 VITALS
RESPIRATION RATE: 14 BRPM | HEART RATE: 91 BPM | SYSTOLIC BLOOD PRESSURE: 113 MMHG | TEMPERATURE: 99 F | BODY MASS INDEX: 27.77 KG/M2 | OXYGEN SATURATION: 99 % | DIASTOLIC BLOOD PRESSURE: 68 MMHG | HEIGHT: 72 IN | WEIGHT: 205 LBS

## 2018-03-05 DIAGNOSIS — E78.6 HDL DEFICIENCY: ICD-10-CM

## 2018-03-05 DIAGNOSIS — E11.9 TYPE 2 DIABETES MELLITUS WITHOUT COMPLICATION, WITHOUT LONG-TERM CURRENT USE OF INSULIN (H): Primary | ICD-10-CM

## 2018-03-05 DIAGNOSIS — Z13.89 SCREENING FOR DIABETIC PERIPHERAL NEUROPATHY: ICD-10-CM

## 2018-03-05 LAB
CREAT UR-MCNC: 288 MG/DL
HBA1C MFR BLD: 5.5 % (ref 4.3–6)
MICROALBUMIN UR-MCNC: 10 MG/L
MICROALBUMIN/CREAT UR: 3.65 MG/G CR (ref 0–17)
TSH SERPL DL<=0.005 MIU/L-ACNC: 1.75 MU/L (ref 0.4–4)

## 2018-03-05 PROCEDURE — 99207 C FOOT EXAM  NO CHARGE: CPT | Mod: 25 | Performed by: FAMILY MEDICINE

## 2018-03-05 PROCEDURE — 99214 OFFICE O/P EST MOD 30 MIN: CPT | Performed by: FAMILY MEDICINE

## 2018-03-05 PROCEDURE — 83036 HEMOGLOBIN GLYCOSYLATED A1C: CPT | Performed by: FAMILY MEDICINE

## 2018-03-05 PROCEDURE — 82043 UR ALBUMIN QUANTITATIVE: CPT | Performed by: FAMILY MEDICINE

## 2018-03-05 PROCEDURE — 84443 ASSAY THYROID STIM HORMONE: CPT | Performed by: FAMILY MEDICINE

## 2018-03-05 PROCEDURE — 36415 COLL VENOUS BLD VENIPUNCTURE: CPT | Performed by: FAMILY MEDICINE

## 2018-03-05 ASSESSMENT — PAIN SCALES - GENERAL: PAINLEVEL: NO PAIN (0)

## 2018-03-05 NOTE — PROGRESS NOTES
To access diabetes educational materials with in EPIC use <dot>ARJUN      Put this in AFTER VISIT SUMMARY if needed for the patient to access information online [email or web address]      SUBJECTIVE:  Jean Lamar is a 32 year old male who presents today for a follow up appointment for management of DIABETES MELLITUS.    Patient Active Problem List    Diagnosis Date Noted     Type 2 diabetes mellitus without complication, without long-term current use of insulin (H) 12/23/2016     Priority: Medium     HDL deficiency 06/07/2016     Priority: Medium           The patient checks his blood sugar as follows: never, once daily.        The patient reports that he IS taking the medication as prescribed. N/a        ----------------------------------------------------------------------------------------------------------------------------------------    BP Readings from Last 3 Encounters:   03/05/18 113/68   09/29/17 115/78   06/20/17 110/76     The patient reports that he IS NOT currently smoking.  History   Smoking Status     Never Smoker   Smokeless Tobacco     Never Used         The ASCVD Risk score (Edin DC Jr, et al., 2013) failed to calculate for the following reasons:    The 2013 ASCVD risk score is only valid for ages 40 to 79        Current Outpatient Prescriptions   Medication     STATIN NOT PRESCRIBED, INTENTIONAL,     fexofenadine (ALLEGRA ALLERGY) 180 MG tablet     blood glucose monitoring (SHAILESH CONTOUR) test strip     STATIN NOT PRESCRIBED, INTENTIONAL,     ASPIRIN NOT PRESCRIBED (INTENTIONAL)     No current facility-administered medications for this visit.        The patient reports that he IS NOT taking a statin.   (Reminder all diabetics with a ASCVD risk greater or equal to 7.5% should be on a high intensity statin, otherwise on a moderate intensity statin)  he is not taking a statin because it is not indicated.   (Reminder to intentionally not prescribe statin in )    The patient  reports that he IS NOT taking  aspirin daily.  (Reminder all diabetic patients with a cardiovascular risk factor and > 50 should take a daily aspirin)   he is not taking aspirin because he is not old enough for that indication.  (Reminder to intentionally not prescribe aspirin in )    The patient reports that he IS doing a self foot exam very infrequently.  The patient reports that he does exercise in the form of work. 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  The patient reports that he IS following the recommended diabetic diet. He  would give himself a C grade on his diet.  The patient reports that his last eye exam was 1.5  years ago.         Immunization History   Administered Date(s) Administered     HepA-Adult 03/24/2017, 09/29/2017     HepB 06/10/2016, 12/23/2016, 03/24/2017     Influenza Vaccine IM 3yrs+ 4 Valent IIV4 12/23/2016, 09/29/2017     Pneumococcal 23 valent 05/23/2016     TD (ADULT, 7+) 05/23/2016     Typhoid IM 03/24/2017     The patient reports that he has had the hepatitis B vaccine series in the past. (recommended for age 19-59 and can be given to age 60 or older)  The patient reports that he has had a pnuemovax in the past.  The patient reports that he has had a flu shot for the current influenza season.  The patient would like to have a no vaccinations today    Patient concerns: has no specific complaints and has been feeling well.      EXAM:  /68  Pulse 91  Temp 99  F (37.2  C) (Oral)  Resp 14  Ht 6' (1.829 m)  Wt 205 lb (93 kg)  SpO2 99%  BMI 27.8 kg/m2  Wt Readings from Last 4 Encounters:   03/05/18 205 lb (93 kg)   09/29/17 199 lb (90.3 kg)   06/20/17 193 lb (87.5 kg)   03/24/17 182 lb (82.6 kg)     Body mass index is 27.8 kg/(m^2).    General:  Jean is awake, alert, and cooperative.  NAD.      Diabetic Foot Screen:  Any complaints of increased pain or numbness ? No  Is there a foot ulcer now or a history of foot  ulcer? No  Does the foot have an abnormal shape? No  Are the nails thick, too long or ingrown? No  Are there any redness or open areas? No         Sensation Testing done at all points on the diagram with monofilament     Right Foot: Sensation Normal at all points  Left Foot: Sensation Normal at all points     Risk Category: 0- No loss of protective sensation  Performed by Kilo Montes MD                  Previsit labs drawn include:  Office Visit on 09/29/2017   Component Date Value Ref Range Status     Hemoglobin A1C 09/29/2017 5.4  4.3 - 6.0 % Final     Cholesterol 09/29/2017 121  <200 mg/dL Final     Triglycerides 09/29/2017 49  <150 mg/dL Final    Fasting specimen     HDL Cholesterol 09/29/2017 46  >39 mg/dL Final     LDL Cholesterol Calculated 09/29/2017 65  <100 mg/dL Final    Desirable:       <100 mg/dl     Non HDL Cholesterol 09/29/2017 75  <130 mg/dL Final     Sodium 09/29/2017 141  133 - 144 mmol/L Final     Potassium 09/29/2017 4.3  3.4 - 5.3 mmol/L Final     Chloride 09/29/2017 105  94 - 109 mmol/L Final     Carbon Dioxide 09/29/2017 29  20 - 32 mmol/L Final     Anion Gap 09/29/2017 7  3 - 14 mmol/L Final     Glucose 09/29/2017 99  70 - 99 mg/dL Final    Fasting specimen     Urea Nitrogen 09/29/2017 14  7 - 30 mg/dL Final     Creatinine 09/29/2017 1.17  0.66 - 1.25 mg/dL Final     GFR Estimate 09/29/2017 71  >60 mL/min/1.7m2 Final    Non  GFR Calc     GFR Estimate If Black 09/29/2017 86  >60 mL/min/1.7m2 Final    African American GFR Calc     Calcium 09/29/2017 9.2  8.5 - 10.1 mg/dL Final     Bilirubin Total 09/29/2017 0.6  0.2 - 1.3 mg/dL Final     Albumin 09/29/2017 4.1  3.4 - 5.0 g/dL Final     Protein Total 09/29/2017 7.3  6.8 - 8.8 g/dL Final     Alkaline Phosphatase 09/29/2017 60  40 - 150 U/L Final     ALT 09/29/2017 25  0 - 70 U/L Final     AST 09/29/2017 11  0 - 45 U/L Final         ASSESSMENT and PLAN:    Type II Diabetes.    DIABETES Type II:                       Lab  Results   Component Value Date    A1C 5.4 09/29/2017       Control   unable to assess  Lab Results   Component Value Date    A1C 5.4 09/29/2017    A1C 5.3 06/20/2017    A1C 5.3 03/24/2017     Lab Results   Component Value Date    MICROL 28 06/20/2017     No results found for: MICROALBUMIN Control   good    Check a HGBA1C , Check a microalbumin, Compliance with the recommended diabetic diet was stressed, Regular aerobic exercise was encouraged, Home glucose monitoring encouraged, Annual eye exam recommended, Self foot exam demonstrated and recommended to be done nightly, Apply moisturizer to feet with in 3 minutes of showering or bathing, Follow up in clinic in 6 months for a diabetes check and Future labs ordered and the patient was instructed to make a lab appt 1 week prior to next diabetes visit      BP/HTN:   BP Readings from Last 3 Encounters:   03/05/18 113/68   09/29/17 115/78   06/20/17 110/76     Control   good    - Medication:continue the current doses of medication.    Treatment.    HYPERCHOLESTEROLEMIA:     The ASCVD Risk score (Edin DC Jr, et al., 2013) failed to calculate for the following reasons:    The 2013 ASCVD risk score is only valid for ages 40 to 79    Lab Results   Component Value Date    LDL 65 09/29/2017      Control   good  Cholesterol   Date Value Ref Range Status   09/29/2017 121 <200 mg/dL Final   06/20/2017 159 <200 mg/dL Final     HDL Cholesterol   Date Value Ref Range Status   09/29/2017 46 >39 mg/dL Final   06/20/2017 53 >39 mg/dL Final     LDL Cholesterol Calculated   Date Value Ref Range Status   09/29/2017 65 <100 mg/dL Final     Comment:     Desirable:       <100 mg/dl   06/20/2017 93 <100 mg/dL Final     Comment:     Desirable:       <100 mg/dl     Triglycerides   Date Value Ref Range Status   09/29/2017 49 <150 mg/dL Final     Comment:     Fasting specimen   06/20/2017 67 <150 mg/dL Final     Comment:     Fasting specimen     No results found for: CHOLHDLRATIO        The  patient's LDL is less than 70 so no statin is indicated at this time.    The patient's HDL is normal  The patient's triglycerides are normal.    We have discussed the results and we will continue the current management.

## 2018-03-05 NOTE — PATIENT INSTRUCTIONS
Please schedule and eye exam with you eye care provider and continue to do so every 1 year(s).you are due now. Please have the eye doctor fax us the note

## 2018-03-05 NOTE — PROGRESS NOTES
Jean,  I have reviewed the results of the laboratory tests that we recently ordered. All of the lab work performed was normal or considered normal for you. Nice work!! We can see you in 6 month(s) for your next diabetes recheck.   Sincerely,   Kilo Montes

## 2018-03-05 NOTE — MR AVS SNAPSHOT
After Visit Summary   3/5/2018    Jean Lamar    MRN: [id number]           Patient Information     Date Of Birth          1985        Visit Information        Provider Department      3/5/2018 9:00 AM Kilo Montes MD [facility]        Today's Diagnoses     Type 2 diabetes mellitus without complication, without long-term current use of insulin (H)    -  1    HDL deficiency        Screening for diabetic peripheral neuropathy          Care Instructions    Please schedule and eye exam with you eye care provider and continue to do so every 1 year(s).you are due now. Please have the eye doctor fax us the note            Follow-ups after your visit        Follow-up notes from your care team     Return in about 6 months (around 9/5/2018) for Diabetes Recheck.      Who to contact     If you have questions or need follow up information about today's clinic visit or your schedule please contact [facility] directly at [phone].  Normal or non-critical lab and imaging results will be communicated to you by [employer], letter or phone within 4 business days after the clinic has received the results. If you do not hear from us within 7 days, please contact the clinic through [employer] or phone. If you have a critical or abnormal lab result, we will notify you by phone as soon as possible.  Submit refill requests through [employer] or call your pharmacy and they will forward the refill request to us. Please allow 3 business days for your refill to be completed.          Additional Information About Your Visit        MyChart Information     [employer] gives you secure access to your electronic health record. If you see a primary care provider, you can also send messages to your care team and make appointments. If you have questions, please call your primary care clinic.  If you do not have a primary care provider, please call [phone] and they will assist you.        Care EveryWhere  ID     This is your Care EveryWhere ID. This could be used by other organizations to access your [place] medical records  [id number]        Your Vitals Were     Pulse Temperature Respirations Height Pulse Oximetry BMI (Body Mass Index)    91 99  F (37.2  C) (Oral) 14 6' (1.829 m) 99% 27.8 kg/m2       Blood Pressure from Last 3 Encounters:   03/05/18 113/68   09/29/17 115/78   06/20/17 110/76    Weight from Last 3 Encounters:   03/05/18 205 lb (93 kg)   09/29/17 199 lb (90.3 kg)   06/20/17 193 lb (87.5 kg)              We Performed the Following     Albumin Random Urine Quantitative with Creat Ratio     FOOT EXAM  NO CHARGE [[id number]]     Hemoglobin A1c     TSH with free T4 reflex        Primary Care Provider Office Phone # Fax #    Kilo Montes [phone] [facility] [address]        Equal Access to Services     CLIFF BARONE : Hadii aad ku hadasho Soomaali, waaxda luqadaha, qaybta kaalmada adeegyada, waxay idiin haykendran mane yaoaraalexandro quintanilla . So [facility] [phone].    ATENCIÓN: Si habla español, tiene a rodriguez disposición servicios gratuitos de asistencia lingüística. Llame al [phone].    We comply with applicable federal civil rights laws and [place] laws. We do not discriminate on the basis of race, color, national origin, age, disability, sex, sexual orientation, or gender identity.            Thank you!     Thank you for choosing [facility]  for your care. Our goal is always to provide you with excellent care. Hearing back from our patients is one way we can continue to improve our services. Please take a few minutes to complete the written survey that you may receive in the mail after your visit with us. Thank you!             Your Updated Medication List - Protect others around you: Learn how to safely use, store and throw away your medicines at www.disposemymeds.org.          This list is accurate as of 3/5/18  9:32 AM.  Always use your most  recent med list.                   Brand Name Dispense Instructions for use Diagnosis    ALLEGRA ALLERGY 180 MG tablet   Generic drug:  fexofenadine      Take 180 mg by mouth daily        ASPIRIN NOT PRESCRIBED    INTENTIONAL     1 each continuous prn for other Antiplatelet medication not prescribed intentionally due to not indicated    Type 2 diabetes mellitus without complication (H)       blood glucose monitoring test strip    SHAILESH CONTOUR    100 strip    Use to test blood sugars 2 times daily or as directed.    Type 2 diabetes mellitus without complication, without long-term current use of insulin (H)       STATIN NOT PRESCRIBED (INTENTIONAL)      continuous prn for other Statin not prescribed intentionally due to {CHOOSE REASON BEFORE SIGNING!!!:139823}        STATIN NOT PRESCRIBED (INTENTIONAL)      continuous prn for other Statin not prescribed intentionally due to {CHOOSE REASON BEFORE SIGNING!!!:052180}

## 2018-03-05 NOTE — NURSING NOTE
Chief Complaint   Patient presents with     Diabetes       Initial /77  Pulse 91  Temp 99  F (37.2  C) (Oral)  Resp 14  Ht 6' (1.829 m)  Wt 205 lb (93 kg)  SpO2 99%  BMI 27.8 kg/m2 Estimated body mass index is 27.8 kg/(m^2) as calculated from the following:    Height as of this encounter: 6' (1.829 m).    Weight as of this encounter: 205 lb (93 kg).  Medication Reconciliation: complete     Alda Mar, cma

## 2018-10-01 ENCOUNTER — OFFICE VISIT (OUTPATIENT)
Dept: FAMILY MEDICINE | Facility: CLINIC | Age: 33
End: 2018-10-01
Payer: COMMERCIAL

## 2018-10-01 VITALS
TEMPERATURE: 97.1 F | WEIGHT: 217 LBS | HEART RATE: 71 BPM | OXYGEN SATURATION: 98 % | DIASTOLIC BLOOD PRESSURE: 84 MMHG | SYSTOLIC BLOOD PRESSURE: 119 MMHG | BODY MASS INDEX: 29.43 KG/M2 | RESPIRATION RATE: 18 BRPM

## 2018-10-01 DIAGNOSIS — E78.6 HDL DEFICIENCY: ICD-10-CM

## 2018-10-01 DIAGNOSIS — Z11.4 SCREENING FOR HIV (HUMAN IMMUNODEFICIENCY VIRUS): Primary | ICD-10-CM

## 2018-10-01 DIAGNOSIS — E11.9 TYPE 2 DIABETES MELLITUS WITHOUT COMPLICATION, WITHOUT LONG-TERM CURRENT USE OF INSULIN (H): ICD-10-CM

## 2018-10-01 DIAGNOSIS — Z23 NEED FOR PROPHYLACTIC VACCINATION AND INOCULATION AGAINST INFLUENZA: ICD-10-CM

## 2018-10-01 LAB
ALBUMIN SERPL-MCNC: 3.9 G/DL (ref 3.4–5)
ALP SERPL-CCNC: 83 U/L (ref 40–150)
ALT SERPL W P-5'-P-CCNC: 26 U/L (ref 0–70)
ANION GAP SERPL CALCULATED.3IONS-SCNC: 7 MMOL/L (ref 3–14)
AST SERPL W P-5'-P-CCNC: 17 U/L (ref 0–45)
BILIRUB SERPL-MCNC: 0.6 MG/DL (ref 0.2–1.3)
BUN SERPL-MCNC: 15 MG/DL (ref 7–30)
CALCIUM SERPL-MCNC: 9.8 MG/DL (ref 8.5–10.1)
CHLORIDE SERPL-SCNC: 104 MMOL/L (ref 94–109)
CHOLEST SERPL-MCNC: 131 MG/DL
CO2 SERPL-SCNC: 32 MMOL/L (ref 20–32)
CREAT SERPL-MCNC: 1.21 MG/DL (ref 0.66–1.25)
GFR SERPL CREATININE-BSD FRML MDRD: 68 ML/MIN/1.7M2
GLUCOSE SERPL-MCNC: 100 MG/DL (ref 70–99)
HBA1C MFR BLD: 5.6 % (ref 0–5.6)
HDLC SERPL-MCNC: 42 MG/DL
LDLC SERPL CALC-MCNC: 61 MG/DL
NONHDLC SERPL-MCNC: 89 MG/DL
POTASSIUM SERPL-SCNC: 4 MMOL/L (ref 3.4–5.3)
PROT SERPL-MCNC: 7.4 G/DL (ref 6.8–8.8)
SODIUM SERPL-SCNC: 143 MMOL/L (ref 133–144)
TRIGL SERPL-MCNC: 142 MG/DL

## 2018-10-01 PROCEDURE — 80053 COMPREHEN METABOLIC PANEL: CPT | Performed by: FAMILY MEDICINE

## 2018-10-01 PROCEDURE — 99214 OFFICE O/P EST MOD 30 MIN: CPT | Mod: 25 | Performed by: FAMILY MEDICINE

## 2018-10-01 PROCEDURE — 83036 HEMOGLOBIN GLYCOSYLATED A1C: CPT | Performed by: FAMILY MEDICINE

## 2018-10-01 PROCEDURE — 90686 IIV4 VACC NO PRSV 0.5 ML IM: CPT | Performed by: FAMILY MEDICINE

## 2018-10-01 PROCEDURE — 36415 COLL VENOUS BLD VENIPUNCTURE: CPT | Performed by: FAMILY MEDICINE

## 2018-10-01 PROCEDURE — 80061 LIPID PANEL: CPT | Performed by: FAMILY MEDICINE

## 2018-10-01 PROCEDURE — 90471 IMMUNIZATION ADMIN: CPT | Performed by: FAMILY MEDICINE

## 2018-10-01 ASSESSMENT — PAIN SCALES - GENERAL: PAINLEVEL: NO PAIN (0)

## 2018-10-01 NOTE — NURSING NOTE
Chief Complaint   Patient presents with     Diabetes     Health Maintenance     orders pended       Initial /83  Pulse 71  Temp 97.1  F (36.2  C) (Oral)  Resp 18  Wt 217 lb (98.4 kg)  SpO2 98%  BMI 29.43 kg/m2 Estimated body mass index is 29.43 kg/(m^2) as calculated from the following:    Height as of 3/5/18: 6' (1.829 m).    Weight as of this encounter: 217 lb (98.4 kg).  Medication Reconciliation: complete  Alda Mar, CMA

## 2018-10-01 NOTE — PROGRESS NOTES
[place] diabetes resourses:  [email or web address]        To access diabetes educational materials with in EPIC use <dot>ARJUN      Put this in AFTER VISIT SUMMARY if needed for the patient to access information online [email or web address]      SUBJECTIVE:  Jean Lamar is a 33 year old male who presents today for a follow up appointment for management of DIABETES MELLITUS.    Patient Active Problem List    Diagnosis Date Noted     Type 2 diabetes mellitus without complication, without long-term current use of insulin (H) 12/23/2016     Priority: Medium     HDL deficiency 06/07/2016     Priority: Medium          The patient checks his blood sugar as follows: never, once daily.      The patient reports that he IS NOT taking the medication as it was discontinued prescribed.   ----------------------------------------------------------------------------------------------------------------------------------------    BP Readings from Last 3 Encounters:   10/01/18 119/84   03/05/18 113/68   09/29/17 115/78     The patient reports that he IS NOT currently smoking.  History   Smoking Status     Never Smoker   Smokeless Tobacco     Never Used           The ASCVD Risk score ([place] DC Jr, et al., 2013) failed to calculate for the following reasons:    The 2013 ASCVD risk score is only valid for ages 40 to 79    Current Outpatient Prescriptions   Medication Sig Dispense Refill     ASPIRIN NOT PRESCRIBED (INTENTIONAL) 1 each continuous prn for other Antiplatelet medication not prescribed intentionally due to not indicated       blood glucose monitoring (SHAILESH CONTOUR) test strip Use to test blood sugars 2 times daily or as directed. (Patient not taking: Reported on 10/1/2018) 100 strip 3     fexofenadine (ALLEGRA ALLERGY) 180 MG tablet Take 180 mg by mouth daily       STATIN NOT PRESCRIBED, INTENTIONAL, continuous prn for other Statin not  prescribed intentionally due to Other not indicated at this time (This option does not exclude patient from measure)  0     STATIN NOT PRESCRIBED, INTENTIONAL, continuous prn for other Statin not prescribed intentionally due to Other  (This option does not exclude patient from measure)  0         The patient reports that he IS NOT taking a statin.   (Reminder all diabetics with a ASCVD risk greater or equal to 7.5% should be on a high intensity statin, otherwise on a moderate intensity statin)      The patient reports that he IS NOT taking aspirin daily.  (Reminder all diabetic patients with a cardiovascular risk factor and > 50 should take a daily aspirin)       The patient reports that he IS doing a self foot exam monthly.  The patient reports that he does exercise in the form of walking  5 times a week while at work.   The patient reports that he IS following the recommended diabetic diet. He  would give himself a C grade on his diet.  The patient reports that his last eye exam was 2 years ago.         Immunization History   Administered Date(s) Administered     HepA-Adult 03/24/2017, 09/29/2017     HepB 06/10/2016, 12/23/2016, 03/24/2017     Influenza Vaccine IM 3yrs+ 4 Valent IIV4 12/23/2016, 09/29/2017     Pneumococcal 23 valent 05/23/2016     TD (ADULT, 7+) 05/23/2016     Typhoid IM 03/24/2017     The patient reports that he has had the hepatitis B vaccine series in the past. (recommended for age 19-59 and can be given to age 60 or older)  The patient reports that he has had a pnuemovax in the past.  The patient reports that he has not had a flu shot for the current influenza season.  The patient would like to have a Influenza      EXAM:  /84  Pulse 71  Temp 97.1  F (36.2  C) (Oral)  Resp 18  Wt 217 lb (98.4 kg)  SpO2 98%  BMI 29.43 kg/m2  Wt Readings from Last 4 Encounters:   10/01/18 217 lb (98.4 kg)   03/05/18 205 lb (93 kg)   09/29/17 199 lb (90.3 kg)   06/20/17 193 lb (87.5 kg)     Body mass  index is 29.43 kg/(m^2).    General:  Jean is awake, alert, and cooperative.  NAD.        Diabetic Foot Screen:  Any complaints of increased pain or numbness ? No  Is there a foot ulcer now or a history of foot ulcer? No  Does the foot have an abnormal shape? No  Are the nails thick, too long or ingrown? No  Are there any redness or open areas? No         Sensation Testing done at all points on the diagram with monofilament     Right Foot: Sensation Normal at all points  Left Foot: Sensation Normal at all points     Risk Category: 0- No loss of protective sensation  Performed by Kilo Montes MD                  Previsit labs drawn include:  Office Visit on 03/05/2018   Component Date Value Ref Range Status     Hemoglobin A1C 03/05/2018 5.5  4.3 - 6.0 % Final     Creatinine Urine 03/05/2018 288  mg/dL Final     Albumin Urine mg/L 03/05/2018 10  mg/L Final     Albumin Urine mg/g Cr 03/05/2018 3.65  0 - 17 mg/g Cr Final     TSH 03/05/2018 1.75  0.40 - 4.00 mU/L Final         ASSESSMENT and PLAN:    Type II Diabetes,    DIABETES Type II:                       Lab Results   Component Value Date    A1C 5.5 03/05/2018       Control   good  Lab Results   Component Value Date    A1C 5.5 03/05/2018    A1C 5.4 09/29/2017    A1C 5.3 06/20/2017     Lab Results   Component Value Date    MICROL 10 03/05/2018     No results found for: MICROALBUMIN Control   good    Check a HGBA1C , Recommended to take ASA  mg daily for appropriate patient, Compliance with the recommended diabetic diet was stressed, Regular aerobic exercise was encouraged, Home glucose monitoring encouraged, Annual eye exam recommended, Flu vaccine recommended, Self foot exam demonstrated and recommended to be done nightly, Apply moisturizer to feet with in 3 minutes of showering or bathing, Follow up in clinic in 3-6 months for a diabetes check and Future labs ordered and the patient was instructed to make a lab appt 1 week prior to next diabetes  "visit      BP/HTN:   BP Readings from Last 3 Encounters:   10/01/18 119/84   03/05/18 113/68   09/29/17 115/78     Control   good    - Medication:continue the current doses of medication.  (make sure to order \"Ace not prescribed intentionally if not on an ACE/ARB)  The patient was advised to do the following therapuetic life style changes  - Dietary sodium restriction and increase potassium and Calcium intake  - Regular aerobic exercise  - Weight loss  - Discontinue smoking if applicable  - Avoid regular NSAID use if applicable  - Avoid regular decongestant use if applicable  - Follow up in clinic in 6 months for a recheck  - Check a basic metabolic panel today if needed    Patient Education: Reviewed risks of hypertension and principles of   Treatment.    HYPERCHOLESTEROLEMIA:     The ASCVD Risk score (Edin AALIYAH Jr, et al., 2013) failed to calculate for the following reasons:    The 2013 ASCVD risk score is only valid for ages 40 to 79    Lab Results   Component Value Date    LDL 65 09/29/2017      Control   unable to assess  Cholesterol   Date Value Ref Range Status   09/29/2017 121 <200 mg/dL Final   06/20/2017 159 <200 mg/dL Final     HDL Cholesterol   Date Value Ref Range Status   09/29/2017 46 >39 mg/dL Final   06/20/2017 53 >39 mg/dL Final     LDL Cholesterol Calculated   Date Value Ref Range Status   09/29/2017 65 <100 mg/dL Final     Comment:     Desirable:       <100 mg/dl   06/20/2017 93 <100 mg/dL Final     Comment:     Desirable:       <100 mg/dl     Triglycerides   Date Value Ref Range Status   09/29/2017 49 <150 mg/dL Final     Comment:     Fasting specimen   06/20/2017 67 <150 mg/dL Final     Comment:     Fasting specimen     No results found for: CHOLHDLRATIO        The patient is fasting and we will recheck the fasting lipid panel .        "

## 2018-10-01 NOTE — PROGRESS NOTES
Injectable Influenza Immunization Documentation    1.  Is the person to be vaccinated sick today?   No    2. Does the person to be vaccinated have an allergy to a component   of the vaccine?   No  Egg Allergy Algorithm Link    3. Has the person to be vaccinated ever had a serious reaction   to influenza vaccine in the past?   No    4. Has the person to be vaccinated ever had Guillain-Barré syndrome?   No    Form completed by Alda Mar cma         Prior to injection verified patient identity using patient's name and date of birth.  Due to injection administration, patient instructed to remain in clinic for 15 minutes  afterwards, and to report any adverse reaction to me immediately.

## 2018-10-01 NOTE — MR AVS SNAPSHOT
After Visit Summary   10/1/2018    Jean Lamar    MRN: [id number]           Patient Information     Date Of Birth          1985        Visit Information        Provider Department      10/1/2018 9:00 AM Kilo Montes MD [facility]        Today's Diagnoses     Screening for HIV (human immunodeficiency virus)    -  1    Need for prophylactic vaccination and inoculation against influenza        HDL deficiency        Type 2 diabetes mellitus without complication, without long-term current use of insulin (H)          Care Instructions    Please schedule and eye exam with you eye care provider and continue to do so every 1 year(s).            Follow-ups after your visit        Follow-up notes from your care team     Return in about 6 months (around 4/1/2019) for Diabetes Recheck.      Who to contact     If you have questions or need follow up information about today's clinic visit or your schedule please contact [facility] directly at [phone].  Normal or non-critical lab and imaging results will be communicated to you by MyChart, letter or phone within 4 business days after the clinic has received the results. If you do not hear from us within 7 days, please contact the clinic through eCommHubhart or phone. If you have a critical or abnormal lab result, we will notify you by phone as soon as possible.  Submit refill requests through Viewabill or call your pharmacy and they will forward the refill request to us. Please allow 3 business days for your refill to be completed.          Additional Information About Your Visit        MyChart Information     Viewabill gives you secure access to your electronic health record. If you see a primary care provider, you can also send messages to your care team and make appointments. If you have questions, please call your primary care clinic.  If you do not have a primary care provider, please call [phone] and they will assist  you.        Care EveryWhere ID     This is your Care EveryWhere ID. This could be used by other organizations to access your [facility] [place] medical records  [id number]        Your Vitals Were     Pulse Temperature Respirations Pulse Oximetry BMI (Body Mass Index)       71 97.1  F (36.2  C) (Oral) 18 98% 29.43 kg/m2        Blood Pressure from Last 3 Encounters:   10/01/18 119/84   03/05/18 113/68   09/29/17 115/78    Weight from Last 3 Encounters:   10/01/18 217 lb (98.4 kg)   03/05/18 205 lb (93 kg)   09/29/17 199 lb (90.3 kg)              We Performed the Following     Comprehensive metabolic panel     FLU VACCINE, SPLIT VIRUS, IM (QUADRIVALENT) [04532]- >3 YRS     HEMOGLOBIN A1C     Lipid panel reflex to direct LDL Fasting     Vaccine Administration, Initial [30512]        Primary Care Provider Office Phone # Fax #    Kilo Montes [phone] [address] [facility] [address]        Equal Access to Services     [facility] LIZABETH : Hadii aad ku hadasho Soomaali, waaxda luqadaha, qaybta kaalmada adeegyada, waxay cariin haykendran mane quintanilla . So [facility] [phone].    ATENCIÓN: Si habla español, tiene a rodriguez disposición servicios gratuitos de asistencia lingüística. Llame al [phone].    We comply with applicable federal civil rights laws and [place] laws. We do not discriminate on the basis of race, color, national origin, age, disability, sex, sexual orientation, or gender identity.            Thank you!     Thank you for choosing [facility]  for your care. Our goal is always to provide you with excellent care. Hearing back from our patients is one way we can continue to improve our services. Please take a few minutes to complete the written survey that you may receive in the mail after your visit with us. Thank you!             Your Updated Medication List - Protect others around you: Learn how to safely use, store and throw away your medicines at  www.disposemymeds.org.          This list is accurate as of 10/1/18  9:29 AM.  Always use your most recent med list.                   Brand Name Dispense Instructions for use Diagnosis    ALLEGRA ALLERGY 180 MG tablet   Generic drug:  fexofenadine      Take 180 mg by mouth daily        ASPIRIN NOT PRESCRIBED    INTENTIONAL     1 each continuous prn for other Antiplatelet medication not prescribed intentionally due to not indicated    Type 2 diabetes mellitus without complication (H)       blood glucose monitoring test strip    SHAILESH CONTOUR    100 strip    Use to test blood sugars 2 times daily or as directed.    Type 2 diabetes mellitus without complication, without long-term current use of insulin (H)       STATIN NOT PRESCRIBED (INTENTIONAL)      continuous prn for other Statin not prescribed intentionally due to {CHOOSE REASON BEFORE SIGNING!!!:483291}        STATIN NOT PRESCRIBED (INTENTIONAL)      continuous prn for other Statin not prescribed intentionally due to {CHOOSE REASON BEFORE SIGNING!!!:090121}

## 2019-03-04 ENCOUNTER — OFFICE VISIT (OUTPATIENT)
Dept: FAMILY MEDICINE | Facility: CLINIC | Age: 34
End: 2019-03-04
Payer: COMMERCIAL

## 2019-03-04 VITALS
OXYGEN SATURATION: 99 % | BODY MASS INDEX: 29.12 KG/M2 | SYSTOLIC BLOOD PRESSURE: 114 MMHG | HEIGHT: 72 IN | RESPIRATION RATE: 20 BRPM | HEART RATE: 83 BPM | WEIGHT: 215 LBS | TEMPERATURE: 98.5 F | DIASTOLIC BLOOD PRESSURE: 80 MMHG

## 2019-03-04 DIAGNOSIS — E11.9 TYPE 2 DIABETES MELLITUS WITHOUT COMPLICATION, WITHOUT LONG-TERM CURRENT USE OF INSULIN (H): ICD-10-CM

## 2019-03-04 DIAGNOSIS — Z13.89 SCREENING FOR DIABETIC PERIPHERAL NEUROPATHY: Primary | ICD-10-CM

## 2019-03-04 LAB
CREAT UR-MCNC: 611 MG/DL
HBA1C MFR BLD: 5.5 % (ref 0–5.6)
MICROALBUMIN UR-MCNC: 38 MG/L
MICROALBUMIN/CREAT UR: 6.15 MG/G CR (ref 0–17)

## 2019-03-04 PROCEDURE — 83036 HEMOGLOBIN GLYCOSYLATED A1C: CPT | Performed by: FAMILY MEDICINE

## 2019-03-04 PROCEDURE — 82043 UR ALBUMIN QUANTITATIVE: CPT | Performed by: FAMILY MEDICINE

## 2019-03-04 PROCEDURE — 99214 OFFICE O/P EST MOD 30 MIN: CPT | Performed by: FAMILY MEDICINE

## 2019-03-04 PROCEDURE — 36415 COLL VENOUS BLD VENIPUNCTURE: CPT | Performed by: FAMILY MEDICINE

## 2019-03-04 PROCEDURE — 99207 C FOOT EXAM  NO CHARGE: CPT | Performed by: FAMILY MEDICINE

## 2019-03-04 ASSESSMENT — MIFFLIN-ST. JEOR: SCORE: 1948.23

## 2019-03-04 ASSESSMENT — PAIN SCALES - GENERAL: PAINLEVEL: NO PAIN (0)

## 2019-03-04 NOTE — PROGRESS NOTES
[place] diabetes resourses:  [email or web address]        To access diabetes educational materials with in EPIC use <dot>ARJUN      Put this in AFTER VISIT SUMMARY if needed for the patient to access information online [email or web address]      SUBJECTIVE:  Jean Lamar is a 33 year old male who presents today for a follow up appointment for management of DIABETES MELLITUS. He was diagnosed in May 2016.    Patient Active Problem List    Diagnosis Date Noted     Type 2 diabetes mellitus without complication, without long-term current use of insulin (H) 12/23/2016     Priority: Medium     HDL deficiency 06/07/2016     Priority: Medium          The patient checks his blood sugar as follows: never  The patient reports that he IS taking the medication as prescribed.  NA      ----------------------------------------------------------------------------------------------------------------------------------------    BP Readings from Last 3 Encounters:   03/04/19 114/80   10/01/18 119/84   03/05/18 113/68     The patient reports that he IS NOT currently smoking.  History   Smoking Status     Never Smoker   Smokeless Tobacco     Never Used           The ASCVD Risk score ([place] DC Jr., et al., 2013) failed to calculate for the following reasons:    The 2013 ASCVD risk score is only valid for ages 40 to 79        Current Outpatient Medications   Medication Sig Dispense Refill     ASPIRIN NOT PRESCRIBED (INTENTIONAL) 1 each continuous prn for other Antiplatelet medication not prescribed intentionally due to not indicated       blood glucose monitoring (SHAILESH CONTOUR) test strip Use to test blood sugars 2 times daily or as directed. 100 strip 3     fexofenadine (ALLEGRA ALLERGY) 180 MG tablet Take 180 mg by mouth daily       STATIN NOT PRESCRIBED (INTENTIONAL) continuous prn for other Statin not prescribed intentionally due to Other   The  patient LDL is less than 70 so no statin is indicatted   (This option does not exclude patient from measure)  0       The patient reports that he IS NOT taking a statin.   (Reminder all diabetics with a ASCVD risk greater or equal to 7.5% should be on a high intensity statin, otherwise on a moderate intensity statin)  he is not taking a statin because it is not indicated.   (Reminder to intentionally not prescribe statin in )    The patient reports that he IS NOT taking aspirin daily.  (Reminder all diabetic patients with a cardiovascular risk factor and > 50 should take a daily aspirin)   he is not taking aspirin because it is not indicated.  (Reminder to intentionally not prescribe aspirin in )    The patient reports that he IS doing a self foot exam weekly.  The patient reports that he does exercise in the form of walking.  The patient reports that he IS following the recommended diabetic diet. He  would give himself a C grade on his diet.  The patient reports that his last eye exam was 3 years ago.         Immunization History   Administered Date(s) Administered     HepA-Adult 03/24/2017, 09/29/2017     HepB 06/10/2016, 12/23/2016, 03/24/2017     Influenza Vaccine IM 3yrs+ 4 Valent IIV4 12/23/2016, 09/29/2017, 10/01/2018     Pneumococcal 23 valent 05/23/2016     TD (ADULT, 7+) 05/23/2016     Typhoid IM 03/24/2017     The patient reports that he has had the hepatitis B vaccine series in the past. (recommended for age 19-59 and can be given to age 60 or older)  The patient reports that he has had a pnuemovax in the past.  The patient reports that he has had a flu shot for the current influenza season.  The patient would like to have a no vaccinations today    Patient concerns: has no specific complaints and has been feeling well.        EXAM:  /80   Pulse 83   Temp 98.5  F (36.9  C) (Oral)   Resp 20   Ht 1.829 m (6')   Wt 97.5 kg (215 lb)   SpO2 99%   BMI 29.16 kg/m    Wt  Readings from Last 4 Encounters:   03/04/19 97.5 kg (215 lb)   10/01/18 98.4 kg (217 lb)   03/05/18 93 kg (205 lb)   09/29/17 90.3 kg (199 lb)     Body mass index is 29.16 kg/m .    General:  Jean is awake, alert, and cooperative.  NAD.    Diabetic Foot Screen:  Any complaints of increased pain or numbness ? No  Is there a foot ulcer now or a history of foot ulcer? No  Does the foot have an abnormal shape? No  Are the nails thick, too long or ingrown? No but there was a distal subungual hematoma on the right great toe nail  Are there any redness or open areas? No         Sensation Testing done at all points on the diagram with monofilament     Right Foot: Sensation Normal at all points  Left Foot: Sensation Normal at all points     Risk Category: 0- No loss of protective sensation  Performed by Kilo Montes MD                  Previsit labs drawn include:  Office Visit on 10/01/2018   Component Date Value Ref Range Status     Hemoglobin A1C 10/01/2018 5.6  0 - 5.6 % Final    Comment: Normal <5.7% Prediabetes 5.7-6.4%  Diabetes 6.5% or higher - adopted from ADA   consensus guidelines.       Cholesterol 10/01/2018 131  <200 mg/dL Final     Triglycerides 10/01/2018 142  <150 mg/dL Final    Fasting specimen     HDL Cholesterol 10/01/2018 42  >39 mg/dL Final     LDL Cholesterol Calculated 10/01/2018 61  <100 mg/dL Final    Desirable:       <100 mg/dl     Non HDL Cholesterol 10/01/2018 89  <130 mg/dL Final     Sodium 10/01/2018 143  133 - 144 mmol/L Final     Potassium 10/01/2018 4.0  3.4 - 5.3 mmol/L Final     Chloride 10/01/2018 104  94 - 109 mmol/L Final     Carbon Dioxide 10/01/2018 32  20 - 32 mmol/L Final     Anion Gap 10/01/2018 7  3 - 14 mmol/L Final     Glucose 10/01/2018 100* 70 - 99 mg/dL Final    Fasting specimen     Urea Nitrogen 10/01/2018 15  7 - 30 mg/dL Final     Creatinine 10/01/2018 1.21  0.66 - 1.25 mg/dL Final     GFR Estimate 10/01/2018 68  >60 mL/min/1.7m2 Final    Non African American GFR  "Calc     GFR Estimate If Black 10/01/2018 82  >60 mL/min/1.7m2 Final    African American GFR Calc     Calcium 10/01/2018 9.8  8.5 - 10.1 mg/dL Final     Bilirubin Total 10/01/2018 0.6  0.2 - 1.3 mg/dL Final     Albumin 10/01/2018 3.9  3.4 - 5.0 g/dL Final     Protein Total 10/01/2018 7.4  6.8 - 8.8 g/dL Final     Alkaline Phosphatase 10/01/2018 83  40 - 150 U/L Final     ALT 10/01/2018 26  0 - 70 U/L Final     AST 10/01/2018 17  0 - 45 U/L Final         ASSESSMENT and PLAN:    Type II Diabetes.    DIABETES Type II:                       Lab Results   Component Value Date    A1C 5.6 10/01/2018       Control   unable to assess  Lab Results   Component Value Date    A1C 5.6 10/01/2018    A1C 5.5 03/05/2018    A1C 5.4 09/29/2017     Lab Results   Component Value Date    MICROL 10 03/05/2018     No results found for: MICROALBUMIN Control   unable to assess    Check a HGBA1C , Check a microalbumin, Recommended to take ASA  mg daily for appropriate patient, Compliance with the recommended diabetic diet was stressed, Regular aerobic exercise was encouraged, Home glucose monitoring encouraged, Annual eye exam recommended, Self foot exam demonstrated and recommended to be done nightly, Apply moisturizer to feet with in 3 minutes of showering or bathing, Follow up in clinic in 3-6 months for a diabetes check and Future labs ordered and the patient was instructed to make a lab appt 1 week prior to next diabetes visit      BP/HTN:   BP Readings from Last 3 Encounters:   03/04/19 114/80   10/01/18 119/84   03/05/18 113/68     Control   good    - Medication:N/A  (make sure to order \"Ace not prescribed intentionally if not on an ACE/ARB)  The patient was advised to do the following therapuetic life style changes  - Dietary sodium restriction and increase potassium and Calcium intake  - Regular aerobic exercise  - Weight loss  - Discontinue smoking if applicable  - Avoid regular NSAID use if applicable  - Avoid regular " decongestant use if applicable  - Follow up in clinic in 6 months for a recheck  - Check a basic metabolic panel today if needed    Patient Education: Reviewed risks of hypertension and principles of   Treatment.    HYPERCHOLESTEROLEMIA:     The ASCVD Risk score (Edin FISCHER Jr., et al., 2013) failed to calculate for the following reasons:    The 2013 ASCVD risk score is only valid for ages 40 to 79    Lab Results   Component Value Date    LDL 61 10/01/2018      Control   good  Cholesterol   Date Value Ref Range Status   10/01/2018 131 <200 mg/dL Final   09/29/2017 121 <200 mg/dL Final     HDL Cholesterol   Date Value Ref Range Status   10/01/2018 42 >39 mg/dL Final   09/29/2017 46 >39 mg/dL Final     LDL Cholesterol Calculated   Date Value Ref Range Status   10/01/2018 61 <100 mg/dL Final     Comment:     Desirable:       <100 mg/dl   09/29/2017 65 <100 mg/dL Final     Comment:     Desirable:       <100 mg/dl     Triglycerides   Date Value Ref Range Status   10/01/2018 142 <150 mg/dL Final     Comment:     Fasting specimen   09/29/2017 49 <150 mg/dL Final     Comment:     Fasting specimen     No results found for: CHOLHDLRATIO      The patient is not on a statin and this is the appropriate treatment as the patient's LDL is less than 70.  No statin is indicated at this time.    The patient's HDL is normal  The patient's triglycerides are normal.    We have discussed the results and we will continue the current management.

## 2019-03-04 NOTE — NURSING NOTE
Chief Complaint   Patient presents with     Diabetes     Health Maintenance     order pended       Initial /70   Pulse 83   Temp 98.5  F (36.9  C) (Oral)   Resp 20   Ht 1.829 m (6')   Wt 97.5 kg (215 lb)   SpO2 99%   BMI 29.16 kg/m   Estimated body mass index is 29.16 kg/m  as calculated from the following:    Height as of this encounter: 1.829 m (6').    Weight as of this encounter: 97.5 kg (215 lb).  Medication Reconciliation: complete  Alda Mar, CMA

## 2019-03-04 NOTE — PATIENT INSTRUCTIONS
Please schedule and eye exam with you eye care provider and continue to do so every 1 year(s). You are due for that now.   Dr Allred is able to do that for you. Her clinic is right across the lobby.         Patient Education     What Is Diabetic Retinopathy?  Diabetic retinopathy is the leading cause of blindness in adults. It happens when diabetes harms blood vessels inside the eye. These weak vessels leak fluid into an area of the eye called the retina. Weak new vessels can break and bleed into the retina. Old vessels can leak and cause swelling. These vessels can damage areas of the retina, causing blurry, distorted vision.    What causes diabetic retinopathy?  Diabetes is the cause of this eye disease. Over time, diabetes makes blood vessels weaken all over the body, including in the eyes. Poor blood sugar control can make retinopathy worse. So can smoking, high cholesterol, or poorly controlled high blood pressure. Pregnancy can also cause retinopathy to worsen. Diabetic retinopathy happens more often in Hispanics and in  Americans.   What are the symptoms?  You can have diabetic retinopathy without knowing it. Usually, there is no pain and no outward sign. Over time, you may notice gradual blurring or some vision loss. Some people have trouble seeing at night. or see spots or floaters. Symptoms may come and go. Early treatment and good control of risk factors may help prevent vision loss or blindness.  What you can do  Have your eyes examined regularly by an eye specialist. Your healthcare provider will tell you when and how often you need these exams. You can also help control your diabetes through exercise, diet, and medicine, as instructed by your healthcare provider. These same steps may also help control diabetic retinopathy.  Date Last Reviewed: 6/1/2016 2000-2018 [employer]. [address] [place] [address], [place] [address]. All rights reserved. This information is not intended as a  substitute for professional medical care. Always follow your healthcare professional's instructions.

## 2019-10-07 ENCOUNTER — OFFICE VISIT (OUTPATIENT)
Dept: FAMILY MEDICINE | Facility: CLINIC | Age: 34
End: 2019-10-07
Payer: COMMERCIAL

## 2019-10-07 VITALS
TEMPERATURE: 97.9 F | WEIGHT: 213 LBS | BODY MASS INDEX: 28.89 KG/M2 | SYSTOLIC BLOOD PRESSURE: 122 MMHG | RESPIRATION RATE: 20 BRPM | DIASTOLIC BLOOD PRESSURE: 74 MMHG | OXYGEN SATURATION: 97 % | HEART RATE: 70 BPM

## 2019-10-07 DIAGNOSIS — E78.6 HDL DEFICIENCY: ICD-10-CM

## 2019-10-07 DIAGNOSIS — E11.9 TYPE 2 DIABETES MELLITUS WITHOUT COMPLICATION, WITHOUT LONG-TERM CURRENT USE OF INSULIN (H): Primary | ICD-10-CM

## 2019-10-07 DIAGNOSIS — Z23 NEED FOR PROPHYLACTIC VACCINATION AND INOCULATION AGAINST INFLUENZA: ICD-10-CM

## 2019-10-07 LAB
ALBUMIN SERPL-MCNC: 4.1 G/DL (ref 3.4–5)
ALP SERPL-CCNC: 72 U/L (ref 40–150)
ALT SERPL W P-5'-P-CCNC: 26 U/L (ref 0–70)
ANION GAP SERPL CALCULATED.3IONS-SCNC: 5 MMOL/L (ref 3–14)
AST SERPL W P-5'-P-CCNC: 16 U/L (ref 0–45)
BILIRUB SERPL-MCNC: 0.5 MG/DL (ref 0.2–1.3)
BUN SERPL-MCNC: 18 MG/DL (ref 7–30)
CALCIUM SERPL-MCNC: 9.3 MG/DL (ref 8.5–10.1)
CHLORIDE SERPL-SCNC: 108 MMOL/L (ref 94–109)
CO2 SERPL-SCNC: 28 MMOL/L (ref 20–32)
CREAT SERPL-MCNC: 1.1 MG/DL (ref 0.66–1.25)
GFR SERPL CREATININE-BSD FRML MDRD: 86 ML/MIN/{1.73_M2}
GLUCOSE SERPL-MCNC: 96 MG/DL (ref 70–99)
HBA1C MFR BLD: 5.3 % (ref 0–5.6)
POTASSIUM SERPL-SCNC: 4.3 MMOL/L (ref 3.4–5.3)
PROT SERPL-MCNC: 7.2 G/DL (ref 6.8–8.8)
SODIUM SERPL-SCNC: 141 MMOL/L (ref 133–144)

## 2019-10-07 PROCEDURE — 92250 FUNDUS PHOTOGRAPHY W/I&R: CPT | Mod: 26 | Performed by: OPHTHALMOLOGY

## 2019-10-07 PROCEDURE — 80053 COMPREHEN METABOLIC PANEL: CPT | Performed by: FAMILY MEDICINE

## 2019-10-07 PROCEDURE — 36415 COLL VENOUS BLD VENIPUNCTURE: CPT | Performed by: FAMILY MEDICINE

## 2019-10-07 PROCEDURE — 92250 FUNDUS PHOTOGRAPHY W/I&R: CPT | Performed by: FAMILY MEDICINE

## 2019-10-07 PROCEDURE — 90686 IIV4 VACC NO PRSV 0.5 ML IM: CPT | Performed by: FAMILY MEDICINE

## 2019-10-07 PROCEDURE — 90471 IMMUNIZATION ADMIN: CPT | Performed by: FAMILY MEDICINE

## 2019-10-07 PROCEDURE — 83036 HEMOGLOBIN GLYCOSYLATED A1C: CPT | Performed by: FAMILY MEDICINE

## 2019-10-07 PROCEDURE — 99214 OFFICE O/P EST MOD 30 MIN: CPT | Performed by: FAMILY MEDICINE

## 2019-10-07 ASSESSMENT — PAIN SCALES - GENERAL: PAINLEVEL: NO PAIN (0)

## 2019-10-07 NOTE — RESULT ENCOUNTER NOTE
Jean,  I have reviewed the results of the laboratory tests that we recently ordered. All of the lab work performed was normal or considered normal for you.  Sincerely,   Kilo Montes MD

## 2019-10-07 NOTE — PROGRESS NOTES
[place] diabetes resourses:  [email or web address]        To access diabetes educational materials with in EPIC use <dot>ARJUN      Put this in AFTER VISIT SUMMARY if needed for the patient to access information online [email or web address]      SUBJECTIVE:  Jean Lamar is a 34 year old male who presents today for a follow up appointment for management of DIABETES MELLITUS.  He was diagnosed with diabetes  in May 2016.    Patient Active Problem List    Diagnosis Date Noted     Type 2 diabetes mellitus without complication, without long-term current use of insulin (H) 12/23/2016     Priority: Medium     HDL deficiency 06/07/2016     Priority: Medium          The patient checks his blood sugar as follows: rarely      The patient reports that he IS taking the medication as prescribed.   His metformin and amaryl have been discontinued because his control was so good. .  He only takes fexofenadine as needed   ----------------------------------------------------------------------------------------------------------------------------------------    BP Readings from Last 3 Encounters:   10/07/19 122/74   03/04/19 114/80   10/01/18 119/84     The patient reports that he IS NOT currently smoking.  History   Smoking Status     Never Smoker   Smokeless Tobacco     Never Used           The ASCVD Risk score ([place] AALIYAH Jr., et al., 2013) failed to calculate for the following reasons:    The 2013 ASCVD risk score is only valid for ages 40 to 79        Current Outpatient Medications   Medication Sig Dispense Refill     ASPIRIN NOT PRESCRIBED (INTENTIONAL) 1 each continuous prn for other Antiplatelet medication not prescribed intentionally due to not indicated       fexofenadine (ALLEGRA ALLERGY) 180 MG tablet Take 180 mg by mouth daily       STATIN NOT PRESCRIBED (INTENTIONAL) continuous prn for other Statin not prescribed intentionally due to  Other   The patient LDL is less than 70 so no statin is indicatted   (This option does not exclude patient from measure)  0     blood glucose monitoring (SHAILESH CONTOUR) test strip Use to test blood sugars 2 times daily or as directed. (Patient not taking: Reported on 10/7/2019) 100 strip 3       The patient reports that he IS NOT taking a statin.   (Reminder all diabetics with a ASCVD risk greater or equal to 7.5% should be on a high intensity statin, otherwise on a moderate intensity statin)  he is not taking a statin because it ins not indicated for him.   (Reminder to intentionally not prescribe statin in )    The patient reports that he IS NOT taking  aspirin daily.  (Reminder all diabetic patients with a cardiovascular risk factor and > 50 should take a daily aspirin)   he is not taking aspirin because it is not indicated for him.  (Reminder to intentionally not prescribe aspirin in )    The patient reports that he IS NOTIS NOT doing a self foot exam very infrequently.  The patient reports that he does exercise in the form of walking and physical labor. He gets quite a bit of exercise on the job 5 day(s) a week.   The patient reports that he IS NOT following the recommended diabetic diet. He  would give himself a F+ grade on his diet.  The patient reports that his last eye exam was 3 years ago.         Immunization History   Administered Date(s) Administered     HepA-Adult 03/24/2017, 09/29/2017     HepB 06/10/2016, 12/23/2016, 03/24/2017     Influenza Vaccine IM > 6 months Valent IIV4 12/23/2016, 09/29/2017, 10/01/2018     Pneumococcal 23 valent 05/23/2016     TD (ADULT, 7+) 05/23/2016     Typhoid IM 03/24/2017     The patient reports that he has had the hepatitis B vaccine series in the past. (recommended for age 19-59 and can be given to age 60 or older)  The patient reports that he has had a pnuemovax in the past.  The patient reports that he has not had a flu shot for the current  influenza season.  The patient would like to have a Influenza    Patient concerns: has no specific complaints and has been feeling well.        EXAM:  /74   Pulse 70   Temp 97.9  F (36.6  C) (Oral)   Resp 20   Wt 96.6 kg (213 lb)   SpO2 97%   BMI 28.89 kg/m    Wt Readings from Last 4 Encounters:   10/07/19 96.6 kg (213 lb)   03/04/19 97.5 kg (215 lb)   10/01/18 98.4 kg (217 lb)   03/05/18 93 kg (205 lb)     Body mass index is 28.89 kg/m .    General:  Jean is awake, alert, and cooperative.  NAD.    Diabetic Foot Screen:  Any complaints of increased pain or numbness ? No  Is there a foot ulcer now or a history of foot ulcer? No  Does the foot have an abnormal shape? No  Are the nails thick, too long or ingrown? No  Are there any redness or open areas? No         Sensation Testing done at all points on the diagram with monofilament     Right Foot: Sensation Normal at all points  Left Foot: Sensation Normal at all points     Risk Category: 0- No loss of protective sensation    Performed by Kilo Montes MD                  Previsit labs drawn include:  Office Visit on 03/04/2019   Component Date Value Ref Range Status     Creatinine Urine 03/04/2019 611  mg/dL Final     Albumin Urine mg/L 03/04/2019 38  mg/L Final     Albumin Urine mg/g Cr 03/04/2019 6.15  0 - 17 mg/g Cr Final     Hemoglobin A1C 03/04/2019 5.5  0 - 5.6 % Final    Comment: Normal <5.7% Prediabetes 5.7-6.4%  Diabetes 6.5% or higher - adopted from ADA   consensus guidelines.           ASSESSMENT and PLAN:    Type II Diabetes.    DIABETES Type II:                       Lab Results   Component Value Date    A1C 5.5 03/04/2019       Control   unable to assess  Lab Results   Component Value Date    A1C 5.5 03/04/2019    A1C 5.6 10/01/2018    A1C 5.5 03/05/2018     Lab Results   Component Value Date    MICROL 38 03/04/2019     No results found for: MICROALBUMIN Control   good    Check a HGBA1C , Check a Creatinine/GFR, Compliance with the  "recommended diabetic diet was stressed, Regular aerobic exercise was encouraged, Home glucose monitoring encouraged, Flu vaccine recommended, Self foot exam demonstrated and recommended to be done nightly, Apply moisturizer to feet with in 3 minutes of showering or bathing, Follow up in clinic in 3-6 months for a diabetes check and Future labs ordered and the patient was instructed to make a lab appt 1 week prior to next diabetes visit.  Ret[facility] Merly done today.       BP/HTN:   BP Readings from Last 3 Encounters:   10/07/19 122/74   03/04/19 114/80   10/01/18 119/84     Control   good    - Medication:N/A  (make sure to order \"Ace not prescribed intentionally if not on an ACE/ARB)  The patient was advised to do the following therapuetic life style changes  - Dietary sodium restriction and increase potassium and Calcium intake  - Regular aerobic exercise  - Weight loss  - Discontinue smoking if applicable  - Avoid regular NSAID use if applicable  - Avoid regular decongestant use if applicable  - Follow up in clinic in 6 months for a recheck  - Check a basic metabolic panel today if needed    Patient Education: Reviewed risks of hypertension and principles of   Treatment.    HYPERCHOLESTEROLEMIA:     The ASCVD Risk score ([place] AALIYAH Jr., et al., 2013) failed to calculate for the following reasons:    The 2013 ASCVD risk score is only valid for ages 40 to 79    Lab Results   Component Value Date    LDL 61 10/01/2018      Control   good  Cholesterol   Date Value Ref Range Status   10/01/2018 131 <200 mg/dL Final   09/29/2017 121 <200 mg/dL Final     HDL Cholesterol   Date Value Ref Range Status   10/01/2018 42 >39 mg/dL Final   09/29/2017 46 >39 mg/dL Final     LDL Cholesterol Calculated   Date Value Ref Range Status   10/01/2018 61 <100 mg/dL Final     Comment:     Desirable:       <100 mg/dl   09/29/2017 65 <100 mg/dL Final     Comment:     Desirable:       <100 mg/dl     Triglycerides   Date Value Ref Range Status "   10/01/2018 142 <150 mg/dL Final     Comment:     Fasting specimen   09/29/2017 49 <150 mg/dL Final     Comment:     Fasting specimen     No results found for: CHOLHDLRATIO      The patient is not on a statin and this is the appropriate treatment based on the patient's LDL being less than 70.

## 2020-03-10 ENCOUNTER — HEALTH MAINTENANCE LETTER (OUTPATIENT)
Age: 35
End: 2020-03-10

## 2020-04-02 NOTE — PROGRESS NOTES
"Subjective     Jean Lamar is a 34 year old male who is being evaluated via a billable telephone visit.      The patient has been notified of following:     \"This telephone visit will be conducted via a call between you and your physician/provider. We have found that certain health care needs can be provided without the need for a physical exam.  This service lets us provide the care you need with a short phone conversation.  If a prescription is necessary we can send it directly to your pharmacy.  If lab work is needed we can place an order for that and you can then stop by our lab to have the test done at a later time.    If during the course of the call the physician/provider feels a telephone visit is not appropriate, you will not be charged for this service.\"     Patient has given verbal consent for Telephone visit?  Yes    Jean Lamar complains of   Chief Complaint   Patient presents with     Diabetes       ALLERGIES  Pneumococcal vaccine and Diptheria-tetanus toxoids-dt [diphtheria-tetanus toxoids]        Reviewed and updated as needed this visit by Provider         Review of Systems          Objective   Reported vitals:  There were no vitals taken for this visit.    alert and no distress  Psych: Alert and oriented times 3; coherent speech, normal   rate and volume, able to articulate logical thoughts, able   to abstract reason, no tangential thoughts, no hallucinations   or delusions  His affect is calm and pleasant     Diagnostic Test Results:  Labs reviewed in Epic        Assessment/Plan:  1. Type 2 diabetes mellitus without complication, without long-term current use of insulin (H)    - A1C FUTURE anytime; Future  - Albumin Random Urine Quantitative with Creat Ratio; Future  - Lipid panel reflex to direct LDL Fasting; Future    No follow-ups on file.      Phone call duration:  11 minutes    Kilo Montes, " MD  --------------------------------------------------------------------------------------------------------------------------------------  [place] diabetes resourses:  [email or web address]        To access diabetes educational materials with in EPIC use <dot>[email or web address]      Put this in AFTER VISIT SUMMARY if needed for the patient to access information online [email or web address]      SUBJECTIVE:  Jean Lamar is a 34 year old male who presents today for a follow up appointment for management of DIABETES MELLITUS.    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  N/A     The patient checks his blood sugar as follows: never, once daily.     The patient reports that he IS NOT taking the medication as prescribed as none is currently prescribed      ----------------------------------------------------------------------------------------------------------------------------------------    BP Readings from Last 3 Encounters:   10/07/19 122/74   03/04/19 114/80   10/01/18 119/84     He does not check his blood pressure at home       The patient reports that he IS NOT currently smoking.  History   Smoking Status     Never Smoker   Smokeless Tobacco     Never Used           The ASCVD Risk score ([place] AALIYAH Jr., et al., 2013) failed to calculate for the following reasons:    The 2013 ASCVD risk score is only valid for ages 40 to 79        Current Outpatient Medications   Medication Sig Dispense Refill     ASPIRIN NOT PRESCRIBED (INTENTIONAL) 1 each continuous prn for other Antiplatelet medication not prescribed intentionally due to not indicated       blood glucose monitoring (SHAILESH  CONTOUR) test strip Use to test blood sugars 2 times daily or as directed. 100 strip 3     fexofenadine (ALLEGRA ALLERGY) 180 MG tablet Take 180 mg by mouth daily       STATIN NOT PRESCRIBED (INTENTIONAL) continuous prn for other Statin not prescribed intentionally due to Other   The patient LDL is less than 70 so no statin is indicatted   (This option does not exclude patient from measure)  0       The patient reports that he IS taking a statin.   (Reminder all diabetics with a ASCVD risk greater or equal to 7.5% should be on a high intensity statin, otherwise on a moderate intensity statin)  he is not taking a statin because his LDL is less than 70.   (Reminder to intentionally not prescribe statin in )    The patient reports that he IS NOT taking aspirin daily.  (Reminder all diabetic patients with a cardiovascular risk factor and > 50 should take a daily aspirin)   he is not taking aspirin because at his age it is not indicated.  (Reminder to intentionally not prescribe aspirin in )    The patient reports that he IS doing a self foot exam daily.  The patient reports that he does exercise in the form of work and also at home does the stationary bike.     The patient reports that he IS NOT following the recommended diabetic diet. He  would give himself a C grade on his diet.  The patient reports that his last eye exam was 6 months ago.         Immunization History   Administered Date(s) Administered     HepA-Adult 03/24/2017, 09/29/2017     HepB 06/10/2016, 12/23/2016, 03/24/2017     Influenza Vaccine IM > 6 months Valent IIV4 12/23/2016, 09/29/2017, 10/01/2018, 10/07/2019     Pneumococcal 23 valent 05/23/2016     TD (ADULT, 7+) 05/23/2016     Typhoid IM 03/24/2017     The patient reports that he has had the hepatitis B vaccine series in the past. (recommended for age 19-59 and can be given to age 60 or older)  The patient reports that he has had a pnuemovax in the past.  The patient reports  that he has not had a flu shot for the current influenza season.  The patient would like to have a no vaccinations today    Patient concerns: has no specific complaints and has been feeling well.        EXAM:  There were no vitals taken for this visit.  Wt Readings from Last 4 Encounters:   10/07/19 96.6 kg (213 lb)   03/04/19 97.5 kg (215 lb)   10/01/18 98.4 kg (217 lb)   03/05/18 93 kg (205 lb)     There is no height or weight on file to calculate BMI.    General:  Jean is awake, alert, and cooperative.  NAD.               }    Diabetic Foot Screen:                Previsit labs drawn include:  Office Visit on 10/07/2019   Component Date Value Ref Range Status     Hemoglobin A1C 10/07/2019 5.3  0 - 5.6 % Final    Comment: Normal <5.7% Prediabetes 5.7-6.4%  Diabetes 6.5% or higher - adopted from ADA   consensus guidelines.       Sodium 10/07/2019 141  133 - 144 mmol/L Final     Potassium 10/07/2019 4.3  3.4 - 5.3 mmol/L Final     Chloride 10/07/2019 108  94 - 109 mmol/L Final     Carbon Dioxide 10/07/2019 28  20 - 32 mmol/L Final     Anion Gap 10/07/2019 5  3 - 14 mmol/L Final     Glucose 10/07/2019 96  70 - 99 mg/dL Final    Fasting specimen     Urea Nitrogen 10/07/2019 18  7 - 30 mg/dL Final     Creatinine 10/07/2019 1.10  0.66 - 1.25 mg/dL Final     GFR Estimate 10/07/2019 86  >60 mL/min/[1.73_m2] Final    Comment: Non  GFR Calc  Starting 12/18/2018, serum creatinine based estimated GFR (eGFR) will be   calculated using the Chronic Kidney Disease Epidemiology Collaboration   (CKD-EPI) equation.       GFR Estimate If Black 10/07/2019 >90  >60 mL/min/[1.73_m2] Final    Comment:  GFR Calc  Starting 12/18/2018, serum creatinine based estimated GFR (eGFR) will be   calculated using the Chronic Kidney Disease Epidemiology Collaboration   (CKD-EPI) equation.       Calcium 10/07/2019 9.3  8.5 - 10.1 mg/dL Final     Bilirubin Total 10/07/2019 0.5  0.2 - 1.3 mg/dL Final     Albumin  "10/07/2019 4.1  3.4 - 5.0 g/dL Final     Protein Total 10/07/2019 7.2  6.8 - 8.8 g/dL Final     Alkaline Phosphatase 10/07/2019 72  40 - 150 U/L Final     ALT 10/07/2019 26  0 - 70 U/L Final     AST 10/07/2019 16  0 - 45 U/L Final         ASSESSMENT and PLAN:    Type II Diabetes.    DIABETES Type II:                       Lab Results   Component Value Date    A1C 5.3 10/07/2019       Control   unable to assess  Lab Results   Component Value Date    A1C 5.3 10/07/2019    A1C 5.5 03/04/2019    A1C 5.6 10/01/2018     Lab Results   Component Value Date    MICROL 38 03/04/2019     No results found for: MICROALBUMIN Control   unable to assess    The pt will make a future lab appoinment for all bloodwork as they are not fasting today, Check a HGBA1C , Check a microalbumin, Recommended to take ASA  mg daily for appropriate patient, Compliance with the recommended diabetic diet was stressed, Regular aerobic exercise was encouraged, Home glucose monitoring encouraged, Annual eye exam recommended, Self foot exam demonstrated and recommended to be done nightly, Apply moisturizer to feet with in 3 minutes of showering or bathing, Follow up in clinic in 3 months for a diabetes check and Future labs ordered and the patient was instructed to make a lab appt 1 week prior to next diabetes visit      BP/HTN:   BP Readings from Last 3 Encounters:   10/07/19 122/74   03/04/19 114/80   10/01/18 119/84     Control   good    - Medication:N/A  (make sure to order \"Ace not prescribed intentionally if not on an ACE/ARB)  The patient was advised to do the following therapuetic life style changes  - Dietary sodium restriction and increase potassium and Calcium intake  - Regular aerobic exercise  - Weight loss  - Discontinue smoking if applicable  - Avoid regular NSAID use if applicable  - Avoid regular decongestant use if applicable  - Follow up in clinic in 6 months for a recheck  - Check a basic metabolic panel today if " needed    Patient Education: Reviewed risks of hypertension and principles of   Treatment.    HYPERCHOLESTEROLEMIA:     The ASCVD Risk score ([place] F[place] Jr., et al., 2013) failed to calculate for the following reasons:    The 2013 ASCVD risk score is only valid for ages 40 to 79    Lab Results   Component Value Date    LDL 61 10/01/2018      Control   unable to assess  Cholesterol   Date Value Ref Range Status   10/01/2018 131 <200 mg/dL Final   09/29/2017 121 <200 mg/dL Final     HDL Cholesterol   Date Value Ref Range Status   10/01/2018 42 >39 mg/dL Final   09/29/2017 46 >39 mg/dL Final     LDL Cholesterol Calculated   Date Value Ref Range Status   10/01/2018 61 <100 mg/dL Final     Comment:     Desirable:       <100 mg/dl   09/29/2017 65 <100 mg/dL Final     Comment:     Desirable:       <100 mg/dl     Triglycerides   Date Value Ref Range Status   10/01/2018 142 <150 mg/dL Final     Comment:     Fasting specimen   09/29/2017 49 <150 mg/dL Final     Comment:     Fasting specimen     No results found for: CHOLHDLRATIO      The patient is not  fasting today and he  will recheck the fasting lipid panel at a future laboratory appointment.

## 2020-04-06 ENCOUNTER — VIRTUAL VISIT (OUTPATIENT)
Dept: FAMILY MEDICINE | Facility: CLINIC | Age: 35
End: 2020-04-06
Payer: COMMERCIAL

## 2020-04-06 DIAGNOSIS — E11.9 TYPE 2 DIABETES MELLITUS WITHOUT COMPLICATION, WITHOUT LONG-TERM CURRENT USE OF INSULIN (H): Primary | ICD-10-CM

## 2020-04-06 PROCEDURE — 99213 OFFICE O/P EST LOW 20 MIN: CPT | Mod: TEL | Performed by: FAMILY MEDICINE

## 2020-04-17 ENCOUNTER — TELEPHONE (OUTPATIENT)
Dept: LAB | Facility: CLINIC | Age: 35
End: 2020-04-17

## 2020-04-17 NOTE — TELEPHONE ENCOUNTER
.Left message for patient to call in regards to 24 hour pre-appointment COVID screening. Patient has an appointment at [facility] on 4/20/2020. Please ask infection screening questions and update appointment notes on [facility] schedule.    .Key Marte on 4/17/2020 at 9:40 AM

## 2020-04-20 DIAGNOSIS — E11.9 TYPE 2 DIABETES MELLITUS WITHOUT COMPLICATION, WITHOUT LONG-TERM CURRENT USE OF INSULIN (H): ICD-10-CM

## 2020-04-20 LAB
CHOLEST SERPL-MCNC: 166 MG/DL
CREAT UR-MCNC: 147 MG/DL
HBA1C MFR BLD: 5.5 % (ref 0–5.6)
HDLC SERPL-MCNC: 47 MG/DL
LDLC SERPL CALC-MCNC: 98 MG/DL
MICROALBUMIN UR-MCNC: 6 MG/L
MICROALBUMIN/CREAT UR: 4.34 MG/G CR (ref 0–17)
NONHDLC SERPL-MCNC: 119 MG/DL
TRIGL SERPL-MCNC: 105 MG/DL

## 2020-04-20 PROCEDURE — 36415 COLL VENOUS BLD VENIPUNCTURE: CPT | Performed by: FAMILY MEDICINE

## 2020-04-20 PROCEDURE — 80061 LIPID PANEL: CPT | Performed by: FAMILY MEDICINE

## 2020-04-20 PROCEDURE — 82043 UR ALBUMIN QUANTITATIVE: CPT | Performed by: FAMILY MEDICINE

## 2020-04-20 PROCEDURE — 83036 HEMOGLOBIN GLYCOSYLATED A1C: CPT | Performed by: FAMILY MEDICINE

## 2020-10-12 ENCOUNTER — TRANSFERRED RECORDS (OUTPATIENT)
Dept: MULTI SPECIALTY CLINIC | Facility: CLINIC | Age: 35
End: 2020-10-12

## 2020-10-12 ENCOUNTER — OFFICE VISIT (OUTPATIENT)
Dept: FAMILY MEDICINE | Facility: CLINIC | Age: 35
End: 2020-10-12
Payer: COMMERCIAL

## 2020-10-12 VITALS
HEIGHT: 72 IN | HEART RATE: 80 BPM | TEMPERATURE: 97.9 F | OXYGEN SATURATION: 97 % | SYSTOLIC BLOOD PRESSURE: 110 MMHG | DIASTOLIC BLOOD PRESSURE: 80 MMHG | WEIGHT: 219 LBS | BODY MASS INDEX: 29.66 KG/M2

## 2020-10-12 DIAGNOSIS — E11.9 TYPE 2 DIABETES MELLITUS WITHOUT COMPLICATION, WITHOUT LONG-TERM CURRENT USE OF INSULIN (H): ICD-10-CM

## 2020-10-12 DIAGNOSIS — Z23 NEED FOR PROPHYLACTIC VACCINATION AND INOCULATION AGAINST INFLUENZA: Primary | ICD-10-CM

## 2020-10-12 DIAGNOSIS — H35.89 OTHER SPECIFIED RETINAL DISORDERS: ICD-10-CM

## 2020-10-12 LAB
ALBUMIN SERPL-MCNC: 4.1 G/DL (ref 3.4–5)
ALP SERPL-CCNC: 91 U/L (ref 40–150)
ALT SERPL W P-5'-P-CCNC: 29 U/L (ref 0–70)
ANION GAP SERPL CALCULATED.3IONS-SCNC: 3 MMOL/L (ref 3–14)
AST SERPL W P-5'-P-CCNC: 13 U/L (ref 0–45)
BILIRUB SERPL-MCNC: 0.6 MG/DL (ref 0.2–1.3)
BUN SERPL-MCNC: 16 MG/DL (ref 7–30)
CALCIUM SERPL-MCNC: 9 MG/DL (ref 8.5–10.1)
CHLORIDE SERPL-SCNC: 108 MMOL/L (ref 94–109)
CHOLEST SERPL-MCNC: 143 MG/DL
CO2 SERPL-SCNC: 30 MMOL/L (ref 20–32)
CREAT SERPL-MCNC: 0.94 MG/DL (ref 0.66–1.25)
GFR SERPL CREATININE-BSD FRML MDRD: >90 ML/MIN/{1.73_M2}
GLUCOSE SERPL-MCNC: 95 MG/DL (ref 70–99)
HBA1C MFR BLD: 5.4 % (ref 0–5.6)
HDLC SERPL-MCNC: 47 MG/DL
LDLC SERPL CALC-MCNC: 71 MG/DL
NONHDLC SERPL-MCNC: 96 MG/DL
POTASSIUM SERPL-SCNC: 4 MMOL/L (ref 3.4–5.3)
PROT SERPL-MCNC: 7.3 G/DL (ref 6.8–8.8)
RETINOPATHY: NEGATIVE
RETINOPATHY: NEGATIVE
SODIUM SERPL-SCNC: 141 MMOL/L (ref 133–144)
TRIGL SERPL-MCNC: 125 MG/DL
TSH SERPL DL<=0.005 MIU/L-ACNC: 1.34 MU/L (ref 0.4–4)

## 2020-10-12 PROCEDURE — 90686 IIV4 VACC NO PRSV 0.5 ML IM: CPT | Performed by: FAMILY MEDICINE

## 2020-10-12 PROCEDURE — 80053 COMPREHEN METABOLIC PANEL: CPT | Performed by: FAMILY MEDICINE

## 2020-10-12 PROCEDURE — 83036 HEMOGLOBIN GLYCOSYLATED A1C: CPT | Performed by: FAMILY MEDICINE

## 2020-10-12 PROCEDURE — 99214 OFFICE O/P EST MOD 30 MIN: CPT | Mod: 25 | Performed by: FAMILY MEDICINE

## 2020-10-12 PROCEDURE — 84443 ASSAY THYROID STIM HORMONE: CPT | Performed by: FAMILY MEDICINE

## 2020-10-12 PROCEDURE — 36415 COLL VENOUS BLD VENIPUNCTURE: CPT | Performed by: FAMILY MEDICINE

## 2020-10-12 PROCEDURE — 80061 LIPID PANEL: CPT | Performed by: FAMILY MEDICINE

## 2020-10-12 PROCEDURE — 99207 PR FOOT EXAM NO CHARGE: CPT | Performed by: FAMILY MEDICINE

## 2020-10-12 PROCEDURE — 90471 IMMUNIZATION ADMIN: CPT | Performed by: FAMILY MEDICINE

## 2020-10-12 ASSESSMENT — MIFFLIN-ST. JEOR: SCORE: 1956.38

## 2020-10-12 ASSESSMENT — PAIN SCALES - GENERAL: PAINLEVEL: NO PAIN (0)

## 2020-10-12 NOTE — NURSING NOTE
Chief Complaint   Patient presents with     Diabetes     Health Maintenance     orders pended, PHQ2, Physical      Flu Shot       Initial /80   Pulse 80   Temp 97.9  F (36.6  C) (Tympanic)   Ht 1.829 m (6')   Wt 99.3 kg (219 lb)   SpO2 97%   BMI 29.70 kg/m   Estimated body mass index is 29.7 kg/m  as calculated from the following:    Height as of this encounter: 1.829 m (6').    Weight as of this encounter: 99.3 kg (219 lb).  Medication Reconciliation: complete  Alda Mar, CMA

## 2020-10-12 NOTE — PATIENT INSTRUCTIONS
Patient Education     Low-Salt Diet  This diet removes foods that are high in salt. It also limits the amount of salt you use when cooking. It is most often used for people with high blood pressure, edema (fluid retention), and kidney, liver, or heart disease.  Table salt contains the mineral sodium. Your body needs sodium to work normally. But too much sodium can make your health problems worse. Your healthcare provider is recommending a low-salt (also called low-sodium) diet for you. Your total daily allowance of salt is 1,500 to 2,300 milligrams (mg). It is less than 1 teaspoon of table salt. This means you can have only about 500 to 700 mg of sodium at each meal. People with certain health problems should limit salt intake to the lower end of the recommended range.    When you cook, don t add much salt. If you can cook without using salt, even better. Don t add salt to your food at the table.  When shopping, read food labels. Salt is often called sodium on the label. Choose foods that are salt-free, low salt, or very low salt. Note that foods with reduced salt may not lower your salt intake enough.    Beans, potatoes, and pasta  Ok: Dry beans, split peas, lentils, potatoes, rice, macaroni, pasta, spaghetti without added salt  Avoid: Potato chips, tortilla chips, and similar products  Breads and cereals  Ok: Low-sodium breads, rolls, cereals, and cakes; low-salt crackers, matzo crackers  Avoid: Salted crackers, pretzels, popcorn, Kinyarwanda toast, pancakes, muffins  Dairy  Ok: Milk, chocolate milk, hot chocolate mix, low-salt cheeses, and yogurt  Avoid: Processed cheese and cheese spreads; Roquefort, Camembert, and cottage cheese; buttermilk, instant breakfast drink  Desserts  Ok: Ice cream, frozen yogurt, juice bars, gelatin, cookies and pies, sugar, honey, jelly, hard candy  Avoid: Most pies, cakes and cookies prepared or processed with salt; instant pudding  Drinks  Ok: Tea, coffee, fizzy (carbonated) drinks,  juices  Avoid: Flavored coffees, electrolyte replacement drinks, sports drinks  Meats  Ok: All fresh meat, fish, poultry, low-salt tuna, eggs, egg substitute  Avoid: Smoked, pickled, brine-cured, or salted meats and fish. This includes steel, chipped beef, corned beef, hot dogs, deli meats, ham, kosher meats, salt pork, sausage, canned tuna, salted codfish, smoked salmon, herring, sardines, or anchovies.  Seasonings and spices  Ok: Most seasonings are okay. Good substitutes for salt include: fresh herb blends, hot sauce, lemon, garlic, gardner, vinegar, dry mustard, parsley, cilantro, horseradish, tomato paste, regular margarine, mayonnaise, unsalted butter, cream cheese, vegetable oil, cream, low-salt salad dressing and gravy.  Avoid: Regular ketchup, relishes, pickles, soy sauce, teriyaki sauce, Worcestershire sauce, BBQ sauce, tartar sauce, meat tenderizer, chili sauce, regular gravy, regular salad dressing, salted butter  Soups  Ok: Low-salt soups and broths made with allowed foods  Avoid: Bouillon cubes, soups with smoked or salted meats, regular soup and broth  Vegetables  Ok: Most vegetables are okay; also low-salt tomato and vegetable juices  Avoid: Sauerkraut and other brine-soaked vegetables; pickles and other pickled vegetables; tomato juice, olives  Date Last Reviewed: 8/1/2016 2000-2019 [employer]. [address] [place] [address]. All rights reserved. This information is not intended as a substitute for professional medical care. Always follow your healthcare professional's instructions.           Patient Education     Uncontrolled High Blood Pressure (Established)    Your blood pressure was unusually high today. This can occur if you ve missed doses of your blood pressure medicine. Or it can happen if you are taking other medicines. These include some asthma inhalers, decongestants, diet pills, and street drugs like cocaine and amphetamine.  Other causes include:    Weight  gain    More salt in your diet    Smoking    Caffeine  Your blood pressure can also rise if you are emotionally upset or in intense pain. It may go back to normal after a period of rest.  Blood pressure measurements are given as 2 numbers. Systolic blood pressure is the upper number. This is the pressure when the heart contracts. Diastolic blood pressure is the lower number. This is the pressure when the heart relaxes between beats. You will see your blood pressure readings written together. For example, a person with a systolic pressure of 118 and a diastolic pressure of 78 will have 118/78 written in the medical record. To be high blood pressure, the numbers must be higher when tested over a period of time.  Blood pressure is categorized as normal, elevated, or stage 1 or stage 2 high blood pressure:    Normal blood pressure is systolic of less than 120 and diastolic of less than 80 (120/80)    Elevated blood pressure is systolic of 120 to 129 and diastolic less than 80    Stage 1 high blood pressure is systolic is 130 to 139 or diastolic between 80 to 89    Stage 2 high blood pressure is when systolic is 140 or higher or the diastolic is 90 or higher  Uncontrolled high blood pressure can cause serious health problems. It raises your risk for heart attack, stroke, and heart failure. In general, if you have high blood pressure, keeping your blood pressure below 130/80 mmHg may help prevent these problems. Your healthcare provider may prescribe medicine to help control blood pressure if lifestyle changes are not enough.  Home care  It s important to take steps to lower your blood pressure. If you are taking blood pressure medicine, the guidelines below may help you need less or no medicines in the future.    Start a weight-loss program if you are overweight.    Cut back on the amount of salt in your diet:  ? Avoid high-salt foods like olives, pickles, smoked meats, and salted potato chips.  ? Don t add salt to your  food at the table.  ? Use only small amounts of salt when cooking.    Start an exercise program. Talk with your healthcare provider about what exercise program is best for you. It doesn t have to be difficult. Even brisk walking for 20 minutes 3 times a week is a good form of exercise.    Avoid medicines that stimulates the heart. This includes many over-the-counter cold and sinus decongestant pills and sprays, as well as diet pills. Check the warnings about high blood pressure on the label. Before purchasing any over-the-counter medicines or supplements, always ask the pharmacist about the product's potential interaction with your high blood pressure and your medicines.    Stimulants such as amphetamine or cocaine could be lethal for someone with high blood pressure. Never take these.    Limit how much caffeine you drink. Or switch to noncaffeinated beverages.    Stop smoking. If you are a long-time smoker, this can be hard. Enroll in a stop-smoking program to make it more likely that you will succeed. Talk with your provider about ways to quit.    Learn how to handle stress better. This is an important part of any program to lower blood pressure. Learn ways to relax. These include meditation, yoga, and biofeedback.    If medicines were prescribed, take them exactly as directed. Missing doses may cause your blood pressure to get out of control.    If you miss a dose or doses of your medicines, check with your healthcare provider or pharmacist about what to do.    Consider buying an automatic blood pressure machine. Your provider may recommend a certain type. You can get one of these at most pharmacies. Measure your blood pressure twice a day, in the morning, and in the late afternoon. Keep a written record of your home blood pressure readings and take the record to your medical appointments.  Here are some additional guidelines on home blood pressure monitoring from the American Heart Association.    Don't smoke or  drink coffee for 30 minutes    Go to the bathroom before the test.    Relax for 5 minutes before taking the measurement.    Sit correctly. Be sure your back is supported. Don't sit on a couch or soft chair. Uncross your feet and place them flat on the floor. Place your arm on a solid, flat surface like a table with the upper arm at heart level. Make certain the middle of the cuff is directly above the bend of the elbow. Check the monitor's instruction manual for an illustration.    Take multiple readings. When you measure, take 2 or 3 readings one minute apart and record all of the results.    Take your blood pressure at the same time every day, or as your healthcare provider recommends.    Record the date, time, and blood pressure reading.    Take the record with you to your next appointment. If your blood pressure monitor has a built-in memory, simply take the monitor with you to your next appointment.    Call your provider if you have several high readings. Don't be frightened by a single high reading, but if you get several high readings, check in with your healthcare provider.    Note: When blood pressure reaches a systolic (top number) of 180 or higher or a diastolic (bottom number) of 110 or higher, emergency medical treatment is required. Call your healthcare provider immediately.  Follow-up care  Regular visits to your own healthcare provider for blood pressure and medicine checks are an important part of your care. Make a follow-up appointment as directed. Bring the record of your home blood pressure readings to the appointment.  When to seek medical advice  Call your healthcare provider right away if any of these occur:    Blood pressure reaches a systolic (top number) of 180 or higher or diastolic (bottom number) of 110 or higher, emergency medical treatment is required.    Chest, arm, shoulder, neck, or upper back pain    Shortness of breath    Severe headache    Throbbing or rushing sound in the  ears    Nosebleed    Extreme drowsiness, confusion, or fainting    Dizziness or dizziness with spinning sensation (vertigo)    Weakness in an arm or leg or on one side of the face    Trouble speaking or seeing   Date Last Reviewed: 1/1/2017 2000-2019 [employer]. [address] [place] [id number]. All rights reserved. This information is not intended as a substitute for professional medical care. Always follow your healthcare professional's instructions.

## 2020-10-12 NOTE — RESULT ENCOUNTER NOTE
Jean,  I have reviewed the results of the laboratory tests that we recently ordered. All of the lab work performed was normal or considered normal for you. Your LDL is back down to 71 which is excellent.  Sincerely,   Kilo Montes MD

## 2020-10-12 NOTE — PROGRESS NOTES
[place] diabetes resourses:  [email or web address]        To access diabetes educational materials with in EPIC use <dot>ARJUN      Put this in AFTER VISIT SUMMARY if needed for the patient to access information online [email or web address]      SUBJECTIVE:  Jean Lamar is a 35 year old male who presents today for a follow up appointment for management of DIABETES MELLITUS.    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  History   Smoking Status     Never Smoker   Smokeless Tobacco     Never Used         The ASCVD Risk score (Edin DC Jr., et al., 2013) failed to calculate for the following reasons:    The 2013 ASCVD risk score is only valid for ages 40 to 79        Current Outpatient Medications   Medication Sig Dispense Refill     ASPIRIN NOT PRESCRIBED (INTENTIONAL) 1 each continuous prn for other Antiplatelet medication not prescribed intentionally due to not indicated       blood glucose monitoring (SHAILESH CONTOUR) test strip Use to test blood sugars 2 times daily or as directed. 100 strip 3     STATIN NOT PRESCRIBED (INTENTIONAL) continuous prn for other Statin not prescribed intentionally due to Other   The patient LDL is less than 70 so no statin is indicatted   (This option does not exclude patient  from measure)  0     fexofenadine (ALLEGRA ALLERGY) 180 MG tablet Take 180 mg by mouth daily         The patient reports that he IS NOT taking a statin.   (Reminder all diabetics with a ASCVD risk greater or equal to 7.5% should be on a high intensity statin, otherwise on a moderate intensity statin)      The patient reports that he IS NOT taking mg of  aspirin daily.  (Reminder all diabetic patients with a cardiovascular risk factor and > 50 should take a daily aspirin)       The patient reports that he IS NOT doing a self foot exam.  The patient reports that he does exercise in the form of walking on the job.   The patient reports that he IS following the recommended diabetic diet. He  would give himself a C grade on his diet.  The patient reports that his last eye exam was 12 months ago.         Immunization History   Administered Date(s) Administered     HepA-Adult 03/24/2017, 09/29/2017     HepB 06/10/2016, 12/23/2016, 03/24/2017     Influenza Vaccine IM > 6 months Valent IIV4 12/23/2016, 09/29/2017, 10/01/2018, 10/07/2019     Pneumococcal 23 valent 05/23/2016     TD (ADULT, 7+) 05/23/2016     Typhoid IM 03/24/2017     The patient reports that he has had the hepatitis B vaccine series in the past. (recommended for age 19-59 and can be given to age 60 or older)  The patient reports that he has had a pnuemovax in the past.  The patient reports that he has not had a flu shot for the current influenza season.  The patient would like to have a Influenza    Patient concerns: has no specific complaints and has been feeling well.      EXAM:  /80   Pulse 80   Temp 97.9  F (36.6  C) (Tympanic)   Ht 1.829 m (6')   Wt 99.3 kg (219 lb)   SpO2 97%   BMI 29.70 kg/m    Wt Readings from Last 4 Encounters:   10/12/20 99.3 kg (219 lb)   10/07/19 96.6 kg (213 lb)   03/04/19 97.5 kg (215 lb)   10/01/18 98.4 kg (217 lb)     Body mass index is 29.7 kg/m .    General:  Jean is awake, alert, and cooperative.   NAD.    Diabetic Foot Screen:  Any complaints of increased pain or numbness ? No  Is there a foot ulcer now or a history of foot ulcer? No  Does the foot have an abnormal shape? No  Are the nails thick, too long or ingrown? No  Are there any redness or open areas? No         Sensation Testing done at all points on the diagram with monofilament     Right Foot: Sensation Normal at all points  Left Foot: Sensation Normal at all points     Risk Category: 0- No loss of protective sensation  Performed by Kilo Montes MD                  Previsit labs drawn include:  Orders Only on 04/20/2020   Component Date Value Ref Range Status     Cholesterol 04/20/2020 166  <200 mg/dL Final     Triglycerides 04/20/2020 105  <150 mg/dL Final    Fasting specimen     HDL Cholesterol 04/20/2020 47  >39 mg/dL Final     LDL Cholesterol Calculated 04/20/2020 98  <100 mg/dL Final    Desirable:       <100 mg/dl     Non HDL Cholesterol 04/20/2020 119  <130 mg/dL Final     Creatinine Urine 04/20/2020 147  mg/dL Final     Albumin Urine mg/L 04/20/2020 6  mg/L Final     Albumin Urine mg/g Cr 04/20/2020 4.34  0 - 17 mg/g Cr Final     Hemoglobin A1C 04/20/2020 5.5  0 - 5.6 % Final    Comment: Normal <5.7% Prediabetes 5.7-6.4%  Diabetes 6.5% or higher - adopted from ADA   consensus guidelines.           ASSESSMENT and PLAN:    Type II Diabetes.    DIABETES Type II:                       Lab Results   Component Value Date    A1C 5.5 04/20/2020       Control   unable to assess  Lab Results   Component Value Date    A1C 5.5 04/20/2020    A1C 5.3 10/07/2019    A1C 5.5 03/04/2019     Lab Results   Component Value Date    MICROL 6 04/20/2020     No results found for: MICROALBUMIN Control   good    Retinal scan taken today   Check a HGBA1C , Check a Creatinine/GFR, Recommended to take ASA  mg daily for appropriate patient, Compliance with the recommended diabetic diet was stressed, Regular aerobic exercise was encouraged, Home glucose  "monitoring encouraged, Annual eye exam recommended, Self foot exam demonstrated and recommended to be done nightly, Apply moisturizer to feet with in 3 minutes of showering or bathing, Follow up in clinic in 3-6 months for a diabetes check and Future labs ordered and the patient was instructed to make a lab appt 1 week prior to next diabetes visit      BP/HTN:   BP Readings from Last 3 Encounters:   10/12/20 127/80   10/07/19 122/74   03/04/19 114/80     Control   fair    - Medication:N/A  (make sure to order \"Ace not prescribed intentionally if not on an ACE/ARB)  The patient was advised to do the following therapuetic life style changes  - Dietary sodium restriction and increase potassium and Calcium intake  - Regular aerobic exercise  - Weight loss  - Discontinue smoking if applicable  - Avoid regular NSAID use if applicable  - Avoid regular decongestant use if applicable  - Follow up in clinic in 6 months for a recheck  - Check a basic metabolic panel today if needed  An educational reference regarding this subject was printed from [employer] and given to the patient.      Patient Education: Reviewed risks of hypertension and principles of   Treatment.    HYPERCHOLESTEROLEMIA:     The ASCVD Risk score ([place] AALIYAH Jr., et al., 2013) failed to calculate for the following reasons:    The 2013 ASCVD risk score is only valid for ages 40 to 79    Lab Results   Component Value Date    LDL 98 04/20/2020      Control   fair  Cholesterol   Date Value Ref Range Status   04/20/2020 166 <200 mg/dL Final   10/01/2018 131 <200 mg/dL Final     HDL Cholesterol   Date Value Ref Range Status   04/20/2020 47 >39 mg/dL Final   10/01/2018 42 >39 mg/dL Final     LDL Cholesterol Calculated   Date Value Ref Range Status   04/20/2020 98 <100 mg/dL Final     Comment:     Desirable:       <100 mg/dl   10/01/2018 61 <100 mg/dL Final     Comment:     Desirable:       <100 mg/dl     Triglycerides   Date Value Ref Range Status   04/20/2020 105 <150 " mg/dL Final     Comment:     Fasting specimen   10/01/2018 142 <150 mg/dL Final     Comment:     Fasting specimen     No results found for: CHOLHDLRATIO      The patient is fasting and we will recheck the fasting lipid panel today

## 2020-10-15 NOTE — RESULT ENCOUNTER NOTE
Jean,  I have reviewed the report of the Retina scan that we recently ordered. The results were normal or considered normal for you as far as diabetes affecting the eyes but the ophthalmologist thought that the optic disc was slightly abnormal and wanted you to see an eye doctor to be checked for glaucoma. Please make that appointment(s) as soon as possible. You can see Dr Thomas here at [place] if you wish.   Sincerely,   Kilo Montes MD

## 2021-04-12 ENCOUNTER — OFFICE VISIT (OUTPATIENT)
Dept: FAMILY MEDICINE | Facility: CLINIC | Age: 36
End: 2021-04-12
Payer: COMMERCIAL

## 2021-04-12 VITALS
DIASTOLIC BLOOD PRESSURE: 93 MMHG | HEIGHT: 72 IN | WEIGHT: 218 LBS | HEART RATE: 75 BPM | SYSTOLIC BLOOD PRESSURE: 116 MMHG | OXYGEN SATURATION: 99 % | TEMPERATURE: 96.9 F | BODY MASS INDEX: 29.53 KG/M2

## 2021-04-12 DIAGNOSIS — E78.00 ELEVATED LDL CHOLESTEROL LEVEL: ICD-10-CM

## 2021-04-12 DIAGNOSIS — E78.6 HDL DEFICIENCY: ICD-10-CM

## 2021-04-12 DIAGNOSIS — Z11.59 ENCOUNTER FOR HEPATITIS C SCREENING TEST FOR LOW RISK PATIENT: ICD-10-CM

## 2021-04-12 DIAGNOSIS — E11.9 TYPE 2 DIABETES MELLITUS WITHOUT COMPLICATION, WITHOUT LONG-TERM CURRENT USE OF INSULIN (H): Primary | ICD-10-CM

## 2021-04-12 LAB
CHOLEST SERPL-MCNC: 172 MG/DL
CREAT UR-MCNC: 56 MG/DL
HBA1C MFR BLD: 5.4 % (ref 0–5.6)
HCV AB SERPL QL IA: NONREACTIVE
HDLC SERPL-MCNC: 47 MG/DL
LDLC SERPL CALC-MCNC: 102 MG/DL
MICROALBUMIN UR-MCNC: <5 MG/L
MICROALBUMIN/CREAT UR: NORMAL MG/G CR (ref 0–17)
NONHDLC SERPL-MCNC: 125 MG/DL
TRIGL SERPL-MCNC: 117 MG/DL

## 2021-04-12 PROCEDURE — 82043 UR ALBUMIN QUANTITATIVE: CPT | Performed by: FAMILY MEDICINE

## 2021-04-12 PROCEDURE — 99214 OFFICE O/P EST MOD 30 MIN: CPT | Performed by: FAMILY MEDICINE

## 2021-04-12 PROCEDURE — 36415 COLL VENOUS BLD VENIPUNCTURE: CPT | Performed by: FAMILY MEDICINE

## 2021-04-12 PROCEDURE — 86803 HEPATITIS C AB TEST: CPT | Performed by: FAMILY MEDICINE

## 2021-04-12 PROCEDURE — 83036 HEMOGLOBIN GLYCOSYLATED A1C: CPT | Performed by: FAMILY MEDICINE

## 2021-04-12 PROCEDURE — 80061 LIPID PANEL: CPT | Performed by: FAMILY MEDICINE

## 2021-04-12 ASSESSMENT — MIFFLIN-ST. JEOR: SCORE: 1951.84

## 2021-04-12 ASSESSMENT — PAIN SCALES - GENERAL: PAINLEVEL: NO PAIN (0)

## 2021-04-12 NOTE — PROGRESS NOTES
[place] diabetes resourses:  [email or web address]        To access diabetes educational materials with in EPIC use <dot>[email or web address]      Put this in AFTER VISIT SUMMARY if needed for the patient to access information online [email or web address]      SUBJECTIVE:  Jean Lamar is a 35 year old male who presents today for a follow up appointment for management of DIABETES MELLITUS.    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     The patient reports that he IS taking the medication as prescribed.   ----------------------------------------------------------------------------------------------------------------------------------------    BP Readings from Last 3 Encounters:   04/12/21 (!) 116/93   10/12/20 110/80   10/07/19 122/74     The patient reports that he IS NOT currently smoking.  History   Smoking Status     Never Smoker   Smokeless Tobacco     Never Used       The ASCVD Risk score ([place] AALIYAH Jr., et al., 2013) failed to calculate for the following reasons:    The 2013 ASCVD risk score is only valid for ages 40 to 79        Current Outpatient Medications   Medication Sig Dispense Refill     ASPIRIN NOT PRESCRIBED (INTENTIONAL) 1 each continuous prn for other Antiplatelet medication not prescribed intentionally due to not indicated       fexofenadine (ALLEGRA ALLERGY) 180 MG tablet Take 180 mg by mouth daily       STATIN NOT PRESCRIBED (INTENTIONAL) continuous prn for other Statin not prescribed intentionally due to Other   The patient LDL is less than 70 so no statin is indicatted   (This option does not exclude patient from measure)  0     blood glucose monitoring (SHAILESH  CONTOUR) test strip Use to test blood sugars 2 times daily or as directed. (Patient not taking: Reported on 4/12/2021) 100 strip 3       The patient reports that he IS NOT taking a statin.   (Reminder all diabetics with a ASCVD risk greater or equal to 7.5% should be on a high intensity statin, otherwise on a moderate intensity statin)  he is not taking a statin because he would like to avoid that for now .   (Reminder to intentionally not prescribe statin in )    The patient reports that he IS NOT taking aspirin daily.  (Reminder all diabetic patients with a cardiovascular risk factor and > 50 should take a daily aspirin)   he is not taking aspirin because it is not indicated at his age.  (Reminder to intentionally not prescribe aspirin in )    The patient reports that he IS doing a self foot exam monthly.  The patient reports that he does exercise in the form of walking  7 times a week for about 30 minutes .  The patient reports that he IS following the recommended diabetic diet. He  would give himself a C grade on his diet.  The patient reports that his last eye exam was 6 months ago. We did the [facility]      Immunization History   Administered Date(s) Administered     HepA-Adult 03/24/2017, 09/29/2017     HepB 06/10/2016, 12/23/2016, 03/24/2017     Influenza Vaccine IM > 6 months Valent IIV4 12/23/2016, 09/29/2017, 10/01/2018, 10/07/2019, 10/12/2020     Pneumococcal 23 valent 05/23/2016     TD (ADULT, 7+) 05/23/2016     Typhoid IM 03/24/2017     The patient reports that he has had the hepatitis B vaccine series in the past. (recommended for age 19-59 and can be given to age 60 or older)  The patient reports that he has had a pnuemovax in the past.  The patient reports that he has had a flu shot for the current influenza season.  The patient would like to have a no vaccinations today        EXAM:  BP (!) 116/93   Pulse 75   Temp 96.9  F (36.1  C) (Tympanic)   Ht 1.829 m (6')   Wt 98.9  kg (218 lb)   SpO2 99%   BMI 29.57 kg/m    Wt Readings from Last 4 Encounters:   04/12/21 98.9 kg (218 lb)   10/12/20 99.3 kg (219 lb)   10/07/19 96.6 kg (213 lb)   03/04/19 97.5 kg (215 lb)     Body mass index is 29.57 kg/m .    General:  Jean is awake, alert, and cooperative.  NAD.        Diabetic Foot Screen:  Any complaints of increased pain or numbness ? No  Is there a foot ulcer now or a history of foot ulcer? No  Does the foot have an abnormal shape? No  Are the nails thick, too long or ingrown? No  Are there any redness or open areas? No         Sensation Testing done at all points on the diagram with monofilament     Right Foot: Sensation Normal at all points  Left Foot: Sensation Normal at all points     Risk Category: 0- No loss of protective sensation  Performed by Kilo Montes MD                  Previsit labs drawn include:  Office Visit on 10/12/2020   Component Date Value Ref Range Status     Sodium 10/12/2020 141  133 - 144 mmol/L Final     Potassium 10/12/2020 4.0  3.4 - 5.3 mmol/L Final     Chloride 10/12/2020 108  94 - 109 mmol/L Final     Carbon Dioxide 10/12/2020 30  20 - 32 mmol/L Final     Anion Gap 10/12/2020 3  3 - 14 mmol/L Final     Glucose 10/12/2020 95  70 - 99 mg/dL Final    Fasting specimen     Urea Nitrogen 10/12/2020 16  7 - 30 mg/dL Final     Creatinine 10/12/2020 0.94  0.66 - 1.25 mg/dL Final     GFR Estimate 10/12/2020 >90  >60 mL/min/[1.73_m2] Final    Comment: Non  GFR Calc  Starting 12/18/2018, serum creatinine based estimated GFR (eGFR) will be   calculated using the Chronic Kidney Disease Epidemiology Collaboration   (CKD-EPI) equation.       GFR Estimate If Black 10/12/2020 >90  >60 mL/min/[1.73_m2] Final    Comment:  GFR Calc  Starting 12/18/2018, serum creatinine based estimated GFR (eGFR) will be   calculated using the Chronic Kidney Disease Epidemiology Collaboration   (CKD-EPI) equation.       Calcium 10/12/2020 9.0  8.5 - 10.1  "mg/dL Final     Bilirubin Total 10/12/2020 0.6  0.2 - 1.3 mg/dL Final     Albumin 10/12/2020 4.1  3.4 - 5.0 g/dL Final     Protein Total 10/12/2020 7.3  6.8 - 8.8 g/dL Final     Alkaline Phosphatase 10/12/2020 91  40 - 150 U/L Final     ALT 10/12/2020 29  0 - 70 U/L Final     AST 10/12/2020 13  0 - 45 U/L Final     Hemoglobin A1C 10/12/2020 5.4  0 - 5.6 % Final    Comment: Normal <5.7% Prediabetes 5.7-6.4%  Diabetes 6.5% or higher - adopted from ADA   consensus guidelines.       Cholesterol 10/12/2020 143  <200 mg/dL Final     Triglycerides 10/12/2020 125  <150 mg/dL Final    Fasting specimen     HDL Cholesterol 10/12/2020 47  >39 mg/dL Final     LDL Cholesterol Calculated 10/12/2020 71  <100 mg/dL Final    Desirable:       <100 mg/dl     Non HDL Cholesterol 10/12/2020 96  <130 mg/dL Final     TSH 10/12/2020 1.34  0.40 - 4.00 mU/L Final         ASSESSMENT and PLAN:    Type II Diabetes,.    DIABETES Type II:                       Lab Results   Component Value Date    A1C 5.4 10/12/2020       Control   unable to assess  Lab Results   Component Value Date    A1C 5.4 10/12/2020    A1C 5.5 04/20/2020    A1C 5.3 10/07/2019     Lab Results   Component Value Date    MICROL 6 04/20/2020     No results found for: MICROALBUMIN Control   unable to assess    Check a HGBA1C , Check a microalbumin, Recommended to take ASA  mg daily for appropriate patient, Compliance with the recommended diabetic diet was stressed, Regular aerobic exercise was encouraged, Home glucose monitoring encouraged, Annual eye exam recommended, Self foot exam demonstrated and recommended to be done nightly, Apply moisturizer to feet with in 3 minutes of showering or bathing, Follow up in clinic in 3 months for a complete physical exam       BP/HTN:   BP Readings from Last 3 Encounters:   04/12/21 (!) 116/93   10/12/20 110/80   10/07/19 122/74     Control   fair    - Medication:N/A  (make sure to order \"Ace not prescribed intentionally if not on an " ACE/ARB)  The patient was advised to do the following therapuetic life style changes  - Dietary sodium restriction and increase potassium and Calcium intake  - Regular aerobic exercise  - Weight loss  - Discontinue smoking if applicable  - Avoid regular NSAID use if applicable  - Avoid regular decongestant use if applicable  - Follow up in clinic in 3 months for a recheck  - Check a basic metabolic panel today if needed    Patient Education: Reviewed risks of hypertension and principles of   Treatment.    HYPERCHOLESTEROLEMIA:     The ASCVD Risk score ([place] FISCHER Jr., et al., 2013) failed to calculate for the following reasons:    The 2013 ASCVD risk score is only valid for ages 40 to 79    Lab Results   Component Value Date    LDL 71 10/12/2020      Control   fair  Cholesterol   Date Value Ref Range Status   10/12/2020 143 <200 mg/dL Final   04/20/2020 166 <200 mg/dL Final     HDL Cholesterol   Date Value Ref Range Status   10/12/2020 47 >39 mg/dL Final   04/20/2020 47 >39 mg/dL Final     LDL Cholesterol Calculated   Date Value Ref Range Status   10/12/2020 71 <100 mg/dL Final     Comment:     Desirable:       <100 mg/dl   04/20/2020 98 <100 mg/dL Final     Comment:     Desirable:       <100 mg/dl     Triglycerides   Date Value Ref Range Status   10/12/2020 125 <150 mg/dL Final     Comment:     Fasting specimen   04/20/2020 105 <150 mg/dL Final     Comment:     Fasting specimen     No results found for: CHOLHDLRATIO            The patient is fasting and we will recheck the fasting lipid panel .

## 2021-04-12 NOTE — NURSING NOTE
Chief Complaint   Patient presents with     Diabetes     Health Maintenance     PHQ2, Adv Dir, Physical       Initial BP (!) 143/90   Pulse 75   Temp 96.9  F (36.1  C) (Tympanic)   Ht 1.829 m (6')   Wt 98.9 kg (218 lb)   SpO2 99%   BMI 29.57 kg/m   Estimated body mass index is 29.57 kg/m  as calculated from the following:    Height as of this encounter: 1.829 m (6').    Weight as of this encounter: 98.9 kg (218 lb).  Medication Reconciliation: complete  Alda Mar, CMA

## 2021-04-12 NOTE — PATIENT INSTRUCTIONS
While the state has opened eligibility to all people, our health system will continue to prioritize those groups who are most at risk of exposure to COVID-19 and/or hospitalization due to COVID-19. Once a larger percentage of these groups are vaccinated, we will open vaccine appointments to a larger group.      We are working hard to begin vaccinating more people against COVID-19. Beginning Wednesday, March 31, we are vaccinating:     Individuals age 50 and older.    All healthcare workers, both paid and unpaid.     People age 16 or 18-49 years with certain medical conditions or disabilities listed in Phase 1b tier 4.    People who identify as one or more of the following high-risk groups: Black/, /[place] Native, Southeast , /, and /.     If you fit into one of these categories, please log in to [employer] using this link to see if we have an open appointment and schedule an appointment.      If there are no appointments left, you will be unable to schedule.   If you have technical difficulty using [employer], call [phone] for assistance.      As vaccine supply increases and we are able to open appointments to more groups, we will share that information on our website:  [email or web address]. Check this website to stay up to date on COVID-19 vaccination information.          Patient Education     Watching Sodium When You Have Diabetes   Measuring sodium   Most people eat about 6 to 18 grams of table salt (sodium chloride) each day. That's about 1 to 3 teaspoons. People with diabetes are told to limit sodium. This helps prevent or control high blood pressure.   The USDA says to limit sodium to less than 2,300 mg per day. It also says you should have no more than 1,500 mg per day if you:     Are     Have high blood pressure, diabetes, or chronic kidney disease    Are age 51 and older  The American  "Heart Association (AHA) urges all people to limit sodium to no more than 1,500 mg a day. That's no matter your age, health, or race. In some cases, your healthcare provider may tell you not to do so. But cutting down on salt may help you stay off medicines for blood pressure.   Common ways to measure sodium are:     28 grams = 1 ounce    1 gram = 1,000 milligrams    5.5 grams of sodium = 1 teaspoon  What foods are high in sodium?   Most foods have some sodium in them. But it's often added to processed, prepared, and prepackaged foods. Some foods high in sodium are:     Meats, such as steel, sausage, ham, cold cuts (bologna), corned beef, and hot dogs    Canned tuna, salmon, sardines, and shellfish    Frozen, breaded, or smoked fish    Canned foods, such as vegetables, soups, and tomato juice    Prepared or premixed foods, such as boxed macaroni and cheese, potato mixes, and frozen dinners    Snacks, such as salted crackers, pretzels, and potato chips    Baked goods like cookies and doughnuts    Cheeses    Other foods, such as olives, pickles, salad dressings, soy sauces, and steak sauce  Many stores have low-sodium foods. When buying food, check the labels for the symbol Na or NaCl, or the words sodium or sodium chloride. These mean sodium is in the food. Some fresh foods are \"salt-free.\" But packaged versions may have a lot of salt. A dietitian can help point you to better food choices.   You can swap other spices and herbs for salt to add flavor. Or you can buy salt substitutes. Talk with your healthcare provider or a dietitian for help with cutting back on sodium. Some salt substitutes have potassium chloride. This may not be safe for people with kidney disease.    [employer] last reviewed this educational content on 8/1/2018 2000-2021 [employer]. All rights reserved. This information is not intended as a substitute for professional medical care. Always follow your healthcare professional's " instructions.           Patient Education     Controlling High Blood Pressure   High blood pressure (hypertension) is often called the silent killer. This is because many people who have it, don t know it. It can be very dangerous. High blood pressure can raise your risk of heart attack, stroke, heart disease, and heart failure. Controlling your blood pressure can decrease your risk of these problems. It's important to know the appropriate blood pressure range and remember to check your blood pressure regularly. Doing so can save your life.   Blood pressure measurements are given as 2 numbers. Systolic blood pressure is the upper number. This is the pressure when the heart contracts. Diastolic blood pressure is the lower number. This is the pressure when the heart relaxes between beats.   Blood pressure is categorized as normal, elevated, or stage 1 or stage 2 high blood pressure:     Normal blood pressure is systolic of less than 120 and diastolic of less than 80 (120/80)    Elevated blood pressure is systolic of 120 to 129 and diastolic less than 80    Stage 1 high blood pressure is systolic of 130 to 139 or diastolic between 80 to 89    Stage 2 high blood pressure is when systolic is 140 or higher or the diastolic is 90 or higher  A heart-healthy lifestyle can help you control your blood pressure without medicines. Here are some things you can do to pursue a heart-healthy lifestyle:     Choose heart-healthy foods     Select low-salt, low-fat foods. Limit sodium intake to 2,400 mg per day or the amount suggested by your healthcare provider.    Limit canned, dried, cured, packaged, and fast foods. These can contain a lot of salt.    Eat 8 to 10 servings of fruits and vegetables every day.    Choose lean meats, fish, or chicken.    Eat whole-grain pasta, brown rice, and beans.    Eat 2 to 3 servings of low-fat or fat-free dairy products.    Ask your doctor about the DASH eating plan. This plan helps reduce blood  pressure.    When you go to a restaurant, ask that your meal be prepared with no added salt.    Stay at a healthy weight     Ask your healthcare provider how many calories to eat a day. Then stick to that number.    Ask your healthcare provider what weight range is healthiest for you. If you are overweight, a weight loss of only 3% to 5% of your body weight can help lower blood pressure. Generally, a good weight loss goal is to lose 10% of your body weight in a year.    Limit snacks and sweets.    Get regular exercise.    Get up and get active     Find activities you enjoy that can be done alone or with friends or family. Such activities might include bicycling, dancing, walking, or jogging.    Park farther away from building entrances to walk more.    Use stairs instead of the elevator.    When you can, walk or bike instead of driving.    [place] leaves, garden, or do household repairs.    Be active at a moderate to vigorous level of physical activity for at least 40 minutes for a minimum of 3 to 4 days a week.     Manage stress     Make time to relax and enjoy life. Find time to laugh.    Communicate your concerns with your loved ones and your healthcare provider.    Visit with family and friends, and keep up with hobbies.    Limit alcohol and quit smoking     Men should have no more than 2 drinks per day.    Women should have no more than 1 drink per day.    Talk with your healthcare provider about quitting smoking. Smoking significantly increases your risk for heart disease and stroke. Ask your healthcare provider about community smoking cessation programs and other options.    Medicines  If lifestyle changes aren t enough, your healthcare provider may prescribe high blood pressure medicine. Take all medicines as prescribed. If you have any questions about your medicines, ask your healthcare provider before stopping or changing them.   [employer] last reviewed this educational content on 6/1/2019 2000-2021 [employer]  [employer]. All rights reserved. This information is not intended as a substitute for professional medical care. Always follow your healthcare professional's instructions.

## 2021-04-13 NOTE — RESULT ENCOUNTER NOTE
Jean,  I have reviewed the results of the laboratory tests that we recently ordered. All of the lab work performed was normal or considered normal for you except the LDL was a little elevated. Ideally we would like to see that below 70. I can certainly prescribe a statin medication to help lower that. If you can get it back down closer to 70 you won't need that. Please let me know your preference.  Sincerely,   Kilo Montes MD

## 2021-04-24 ENCOUNTER — HEALTH MAINTENANCE LETTER (OUTPATIENT)
Age: 36
End: 2021-04-24

## 2021-05-20 ENCOUNTER — TELEPHONE (OUTPATIENT)
Dept: FAMILY MEDICINE | Facility: CLINIC | Age: 36
End: 2021-05-20

## 2021-05-20 NOTE — TELEPHONE ENCOUNTER
Patient Quality Outreach      Summary:    Patient has the following on his problem list/HM:     Diabetes    Last A1C:  Lab Results   Component Value Date    A1C 5.4 04/12/2021    A1C 5.4 10/12/2020       Last LDL:    Lab Results   Component Value Date     04/12/2021       Is the patient on a Statin? No          Is the patient on Aspirin? No    Medications     HMG CoA Reductase Inhibitors     STATIN NOT PRESCRIBED (INTENTIONAL)       Salicylates     ASPIRIN NOT PRESCRIBED (INTENTIONAL)             Last three blood pressure readings:  BP Readings from Last 3 Encounters:   04/12/21 (!) 116/93   10/12/20 110/80   10/07/19 122/74            Tobacco Use      Smoking status: Never Smoker      Smokeless tobacco: Never Used          Patient is due/failing the following:   Patient was just seen for Diabetes, patient has an appointment already schedule for a physical in July    Type of outreach:    None    Questions for provider review:    None                                                                                                                                     Alda Mar cma       Chart routed to closed.

## 2021-07-16 NOTE — PATIENT INSTRUCTIONS
Preventive Health Recommendations  Male Ages 26 - 39    Yearly exam:             See your health care provider every year in order to  o   Review health changes.   o   Discuss preventive care.    o   Review your medicines if your doctor has prescribed any.    You should be tested each year for STDs (sexually transmitted diseases), if you re at risk.     After age 35, talk to your provider about cholesterol testing. If you are at risk for heart disease, have your cholesterol tested at least every 5 years.     If you are at risk for diabetes, you should have a diabetes test (fasting glucose).  Shots: Get a flu shot each year. Get a tetanus shot every 10 years.     Nutrition:    Eat at least 5 servings of fruits and vegetables daily.     Eat whole-grain bread, whole-wheat pasta and brown rice instead of white grains and rice.     Get adequate Calcium and Vitamin D.     Lifestyle    Exercise for at least 150 minutes a week (30 minutes a day, 5 days a week). This will help you control your weight and prevent disease.     Limit alcohol to one drink per day.     No smoking.     Wear sunscreen to prevent skin cancer.     See your dentist every six months for an exam and cleaning.     Patient Education     Testicular Self-Exam (JAMEEL)  Testicular cancer is the most common form of cancer in men between the ages of 15 and 35. Most cases affect men under 55. It usually shows up as a painless lump in the testicle. The good news is that a simple monthly self-exam may help find trouble before it gets serious. When detected early, testicular cancer is almost 100% curable.       Doing your JAMEEL  Do a JAMEEL once a month, during or after a warm shower. The warm shower helps the scrotum relax. This makes the exam easier to do. Spend about 3 minutes to 5 minutes feeling for lumps, firm areas, or changes. If you do find a problem, don t panic. Call your healthcare provider and make an appointment.  Check the testicles  Hold your scrotum in  the palm of your hand. Roll each testicle gently between the thumbs and fingers of both hands. Feel for changes in each testicle, one at a time.  Check the epididymis  The epididymis is a raised, rim-like structure responsible for storing sperm . It runs along the top and back of each testicle and often hurts when you press on it. Gently feel each epididymis for changes. A spermatocele is a cyst filled with fluid. It may be felt on or near the epididymis or testicle. Most often, spermatoceles are painless and not cancerous.   Check the vas deferens  The vas deferens is a little tube that runs up from the top of each testicle. A normal vas deferens feels like a firm piece of cooked spaghetti. Feel for changes above each testicle.  Professional screening  If you feel any abnormalities, tell your healthcare provider right away. In addition to doing your own JAMEEL, you should also see your healthcare provider for regular checkups.  [employer] last reviewed this educational content on 6/1/2019 2000-2021 [employer]. All rights reserved. This information is not intended as a substitute for professional medical care. Always follow your healthcare professional's instructions.           Patient Education     Preventing Osteoporosis: Meeting Your Calcium Needs    Your body needs calcium to build and repair bones. But it can't make calcium on its own. That's why it's important to eat calcium-rich foods. Some foods are naturally rich in calcium. Others have calcium added (fortified). It's best to get calcium from the foods you eat. But if you can't get enough, you may want to take calcium supplements. To meet your daily calcium needs, try the foods listed below.  Dairy Fish & beans Other sources   Source   Calcium (mg) per serving   Source   Calcium (mg) per serving   Source   Calcium (mg) per serving   Low-fat yogurt, plain   415 mg/8 oz.   Sardines, Atlantic, canned, with bones   351 mg/3 oz.   Oatmeal, instant,  fortified   215 mg/1 cup   Nonfat milk   302 mg/1 cup   Musella, sockeye, canned, with bones   239 mg/3 oz.   Tofu made with calcium sulfate   204 mg/3 oz.   Low-fat milk   297 mg/1 cup   Soybeans, fresh, boiled   131 mg/1/2 cup   Collards   179 mg/1/2 cup   Swiss cheese   272 mg/1 oz.   White beans, cooked   81 mg/1/2 cup   English muffin, whole wheat   175 mg/1 muffin   Cheddar cheese   205 mg/1 oz.   Navy beans, cooked   79 mg/1/2 cup   Kale   90 mg/1/2 cup   Ice cream strawberry   79 mg/1/2 cup           Orange, navel   56 mg/1 medium   Note: Calcium levels may vary depending on brand and size.  Daily calcium needs  14 to 18 years old: 1,300 mg  19 to 30 years old: 1,000 mg  31 to 50 years old: 1,000 mg  51 to 70 years old, women: 1,200 mg  51 to 70 years old, men: 1,000 mg  Pregnant or nursin to 18 years old: 1,300 mg, 19 to 50 years old: 1,000 mg  Older than 70 (women and men): 1,200 mg   [employer] last reviewed this educational content on 2018-2021 [employer]. All rights reserved. This information is not intended as a substitute for professional medical care. Always follow your healthcare professional's instructions.

## 2021-07-16 NOTE — PROGRESS NOTES
"  3  SUBJECTIVE:   CC: Jean Lamar is an 36 year old male who presents for preventive health visit.     {Split Bill scripting  The purpose of this visit is to discuss your medical history and prevent health problems before you are sick. You may be responsible for a co-pay, coinsurance, or deductible if your visit today includes services such as checking on a sore throat, having an x-ray or lab test, or treating and evaluating a new or existing condition :683197}  Patient has been advised of split billing requirements and indicates understanding: {Yes and No:473627}  Healthy Habits:    Do you get at least three servings of calcium containing foods daily (dairy, green leafy vegetables, etc.)? { :301509::\"yes\"}    Amount of exercise or daily activities, outside of work: { :650212}    Problems taking medications regularly { :814232::\"No\"}    Medication side effects: { :753565::\"No\"}    Have you had an eye exam in the past two years? { :064903}    Do you see a dentist twice per year? { :334234}    Do you have sleep apnea, excessive snoring or daytime drowsiness?{ :439797}  {Outside tests to abstract? :040564}    {additional problems to add (Optional):274721}    Today's PHQ-2 Score:   PHQ-2 ( 1999 [employer]) 4/12/2021 10/12/2020   Q1: Little interest or pleasure in doing things 0 0   Q2: Feeling down, depressed or hopeless 0 0   PHQ-2 Score 0 0     {PHQ-2 LOOK IN ASSESSMENTS (Optional) :950435}  Abuse: Current or Past(Physical, Sexual or Emotional)- {YES/NO/NA:022008}  Do you feel safe in your environment? {YES/NO/NA:605763}        Social History     Tobacco Use     Smoking status: Never Smoker     Smokeless tobacco: Never Used   Substance Use Topics     Alcohol use: Not Currently     Comment: rarely     If you drink alcohol do you typically have >3 drinks per day or >7 drinks per week? {ETOH :561342}                      Last PSA: No results found for: PSA    Reviewed orders with patient. Reviewed health maintenance and " "updated orders accordingly - {Yes/No:329260::\"Yes\"}  {Chronicprobdata (Optional):084700}    Reviewed and updated as needed this visit by clinical staff                 Reviewed and updated as needed this visit by Provider                {HISTORY OPTIONS (Optional):911257}    ROS:  { :380816::\"CONSTITUTIONAL: NEGATIVE for fever, chills, change in weight\",\"INTEGUMENTARY/SKIN: NEGATIVE for worrisome rashes, moles or lesions\",\"EYES: NEGATIVE for vision changes or irritation\",\"ENT: NEGATIVE for ear, mouth and throat problems\",\"RESP: NEGATIVE for significant cough or SOB\",\"CV: NEGATIVE for chest pain, palpitations or peripheral edema\",\"GI: NEGATIVE for nausea, abdominal pain, heartburn, or change in bowel habits\",\" male: negative for dysuria, hematuria, decreased urinary stream, erectile dysfunction, urethral discharge\",\"MUSCULOSKELETAL: NEGATIVE for significant arthralgias or myalgia\",\"NEURO: NEGATIVE for weakness, dizziness or paresthesias\",\"PSYCHIATRIC: NEGATIVE for changes in mood or affect\"}    OBJECTIVE:   There were no vitals taken for this visit.  EXAM:  {Exam Choices:296918}    {Diagnostic Test Results (Optional):770445::\"Diagnostic Test Results:\",\"Labs reviewed in Epic\"}    ASSESSMENT/PLAN:   {Diag Picklist:664667}    Patient has been advised of split billing requirements and indicates understanding: {YES / NO:333391::\"Yes\"}  COUNSELING:  {MALE COUNSELING MESSAGES:068548::\"Reviewed preventive health counseling, as reflected in patient instructions\"}    Estimated body mass index is 29.57 kg/m  as calculated from the following:    Height as of 4/12/21: 1.829 m (6').    Weight as of 4/12/21: 98.9 kg (218 lb).    {Weight Management Plan (ACO) Complete if BMI is abnormal-  Ages 18-64  BMI >24.9.  Age 65+ with BMI <23 or >30 (Optional):890944}    He reports that he has never smoked. He has never used smokeless tobacco.      Counseling Resources:  ATP IV Guidelines  Pooled Cohorts Equation Calculator  FRAX Risk " Assessment  ICSI Preventive Guidelines  Dietary Guidelines for Americans, 2010  USDA's MyPlate  ASA Prophylaxis  Lung CA Screening    Kilo Montes MD  [facility]

## 2021-07-19 ENCOUNTER — OFFICE VISIT (OUTPATIENT)
Dept: FAMILY MEDICINE | Facility: CLINIC | Age: 36
End: 2021-07-19
Payer: COMMERCIAL

## 2021-07-19 VITALS
HEART RATE: 78 BPM | TEMPERATURE: 97.9 F | BODY MASS INDEX: 28.35 KG/M2 | OXYGEN SATURATION: 97 % | SYSTOLIC BLOOD PRESSURE: 118 MMHG | WEIGHT: 209 LBS | DIASTOLIC BLOOD PRESSURE: 72 MMHG

## 2021-07-19 DIAGNOSIS — E78.00 ELEVATED LDL CHOLESTEROL LEVEL: ICD-10-CM

## 2021-07-19 DIAGNOSIS — E11.9 TYPE 2 DIABETES MELLITUS WITHOUT COMPLICATION, WITHOUT LONG-TERM CURRENT USE OF INSULIN (H): ICD-10-CM

## 2021-07-19 DIAGNOSIS — E78.6 HDL DEFICIENCY: ICD-10-CM

## 2021-07-19 DIAGNOSIS — Z23 NEED FOR VACCINATION: ICD-10-CM

## 2021-07-19 DIAGNOSIS — Z00.00 ROUTINE GENERAL MEDICAL EXAMINATION AT A HEALTH CARE FACILITY: Primary | ICD-10-CM

## 2021-07-19 LAB
CHOLEST SERPL-MCNC: 180 MG/DL
FASTING STATUS PATIENT QL REPORTED: YES
HDLC SERPL-MCNC: 56 MG/DL
LDLC SERPL CALC-MCNC: 111 MG/DL
NONHDLC SERPL-MCNC: 124 MG/DL
TRIGL SERPL-MCNC: 64 MG/DL

## 2021-07-19 PROCEDURE — 90715 TDAP VACCINE 7 YRS/> IM: CPT | Performed by: FAMILY MEDICINE

## 2021-07-19 PROCEDURE — 90471 IMMUNIZATION ADMIN: CPT | Performed by: FAMILY MEDICINE

## 2021-07-19 PROCEDURE — 99395 PREV VISIT EST AGE 18-39: CPT | Mod: 25 | Performed by: FAMILY MEDICINE

## 2021-07-19 PROCEDURE — 80061 LIPID PANEL: CPT | Performed by: FAMILY MEDICINE

## 2021-07-19 PROCEDURE — 36415 COLL VENOUS BLD VENIPUNCTURE: CPT | Performed by: FAMILY MEDICINE

## 2021-07-19 ASSESSMENT — ENCOUNTER SYMPTOMS
DIZZINESS: 0
HEMATURIA: 0
HEADACHES: 0
SHORTNESS OF BREATH: 0
CHILLS: 0
ABDOMINAL PAIN: 0
PALPITATIONS: 0
DYSURIA: 0
NAUSEA: 0
HEARTBURN: 0
NERVOUS/ANXIOUS: 0
FREQUENCY: 0
SORE THROAT: 0
HEMATOCHEZIA: 0
JOINT SWELLING: 0
MYALGIAS: 0
CONSTIPATION: 0
COUGH: 0
PARESTHESIAS: 0
FEVER: 0
DIARRHEA: 0
WEAKNESS: 0
EYE PAIN: 0
ARTHRALGIAS: 0

## 2021-07-19 ASSESSMENT — PAIN SCALES - GENERAL: PAINLEVEL: NO PAIN (0)

## 2021-07-19 NOTE — NURSING NOTE
Prior to immunization administration, verified patients identity using patient s name and date of birth. Please see Immunization Activity for additional information.     Screening Questionnaire for Adult Immunization    Are you sick today?   No   Do you have allergies to medications, food, a vaccine component or latex?   No   Have you ever had a serious reaction after receiving a vaccination?   No   Do you have a long-term health problem with heart, lung, kidney, or metabolic disease (e.g., diabetes), asthma, a blood disorder, no spleen, complement component deficiency, a cochlear implant, or a spinal fluid leak?  Are you on long-term aspirin therapy?   No   Do you have cancer, leukemia, HIV/AIDS, or any other immune system problem?   No   Do you have a parent, brother, or sister with an immune system problem?   No   In the past 3 months, have you taken medications that affect  your immune system, such as prednisone, other steroids, or anticancer drugs; drugs for the treatment of rheumatoid arthritis, Crohn s disease, or psoriasis; or have you had radiation treatments?   No   Have you had a seizure, or a brain or other nervous system problem?   No   During the past year, have you received a transfusion of blood or blood    products, or been given immune (gamma) globulin or antiviral drug?   No   For women: Are you pregnant or is there a chance you could become       pregnant during the next month?   No   Have you received any vaccinations in the past 4 weeks?   No     Immunization questionnaire answers were all negative.        Per orders of Dr. Montes, injection of TDAP given by Alda aMr CMA. Patient instructed to remain in clinic for 15 minutes afterwards, and to report any adverse reaction to me immediately.       Screening performed by Alda Mar CMA on 7/19/2021 at 10:48 AM.

## 2021-07-19 NOTE — PROGRESS NOTES
SUBJECTIVE:   CC: Jean Lamar is an 36 year old male who presents for preventative health visit.       Patient has been advised of split billing requirements and indicates understanding: Yes  Healthy Habits:     Getting at least 3 servings of Calcium per day:  NO    Bi-annual eye exam:  Yes    Dental care twice a year:  NO    Sleep apnea or symptoms of sleep apnea:  None    Diet:  Diabetic    Frequency of exercise:  None    Taking medications regularly:  Yes    Medication side effects:  None    PHQ-2 Total Score: 0    Additional concerns today:  No              Today's PHQ-2 Score:   PHQ-2 ( 1999 Pf[employer]) 7/19/2021   Q1: Little interest or pleasure in doing things 0   Q2: Feeling down, depressed or hopeless 0   PHQ-2 Score 0   Q1: Little interest or pleasure in doing things Not at all   Q2: Feeling down, depressed or hopeless Not at all   PHQ-2 Score 0       Abuse: Current or Past(Physical, Sexual or Emotional)- No  Do you feel safe in your environment? Yes        Social History     Tobacco Use     Smoking status: Never Smoker     Smokeless tobacco: Never Used   Substance Use Topics     Alcohol use: Not Currently     Comment: rarely     If you drink alcohol do you typically have >3 drinks per day or >7 drinks per week? No    Alcohol Use 7/19/2021   Prescreen: >3 drinks/day or >7 drinks/week? No       Last PSA: No results found for: PSA    Reviewed orders with patient. Reviewed health maintenance and updated orders accordingly -       Reviewed and updated as needed this visit by clinical staff  Tobacco  Allergies  Meds   Med Hx  Surg Hx  Fam Hx  Soc Hx        Reviewed and updated as needed this visit by Provider                    Review of Systems      OBJECTIVE:   /72   Pulse 78   Temp 97.9  F (36.6  C) (Tympanic)   Wt 94.8 kg (209 lb)   SpO2 97%   BMI 28.35 kg/m      Physical Exam      Diagnostic Test Results:  Labs reviewed in Epic    ASSESSMENT/PLAN:       ICD-10-CM    1. Routine general  medical examination at a health care facility  Z00.00        Patient has been advised of split billing requirements and indicates understanding: Yes  COUNSELING:   Reviewed preventive health counseling, as reflected in patient instructions       Regular exercise       Vision screening       Immunizations    Vaccinated for: TDAP             Family planning       Consider Hep C screening for all patients one time for ages 18-79 years       HIV screeninx in teen years, 1x in adult years, and at intervals if high risk       Osteoporosis prevention/bone health       One time pneumovax for diabetics    Estimated body mass index is 28.35 kg/m  as calculated from the following:    Height as of 21: 1.829 m (6').    Weight as of this encounter: 94.8 kg (209 lb).     Weight management plan: Discussed healthy diet and exercise guidelines    He reports that he has never smoked. He has never used smokeless tobacco.      Counseling Resources:  ATP IV Guidelines  Pooled Cohorts Equation Calculator  FRAX Risk Assessment  ICSI Preventive Guidelines  Dietary Guidelines for Americans,   Email Data Source's MyPlate  ASA Prophylaxis  Lung CA Screening    Kilo Montes MD  [facility]  --------------------------------------------------------------------------------------------------------------------------------------  SUBJECTIVE:  Jean Lamar is a 36 year old male who presents to the clinic today for a routine physical exam.    The patient's last physical was many years ago.     Cholesterol   Date Value Ref Range Status   2021 172 <200 mg/dL Final   10/12/2020 143 <200 mg/dL Final     HDL Cholesterol   Date Value Ref Range Status   2021 47 >39 mg/dL Final   10/12/2020 47 >39 mg/dL Final     LDL Cholesterol Calculated   Date Value Ref Range Status   2021 102 (H) <100 mg/dL Final     Comment:     Above desirable:  100-129 mg/dl  Borderline High:  130-159 mg/dL  High:             160-189  mg/dL  Very high:       >189 mg/dl     10/12/2020 71 <100 mg/dL Final     Comment:     Desirable:       <100 mg/dl     Triglycerides   Date Value Ref Range Status   04/12/2021 117 <150 mg/dL Final     Comment:     Fasting specimen   10/12/2020 125 <150 mg/dL Final     Comment:     Fasting specimen     No results found for: CHOLHDLRATIO  The patient's last fasting lipid panel was done 3 months ago and the results are listed above.        The ASCVD Risk score (Houstonshelley FISCHER Jr., et al., 2013) failed to calculate for the following reasons:    The 2013 ASCVD risk score is only valid for ages 40 to 79      Risk Enhancers:  Diabetes     The patient reports that he has never been treated for high blood pressure.    The patient reports that he does not take a daily aspirin.    Lab Results   Component Value Date    HCVAB Nonreactive 04/12/2021     The patient reports that he has been screened for Hepatitis C    (Screen all baby boomers once per CDC-- the generation born from 1945 through 1965 and per USPTF screen age 19 to 79 especially younger people who have used IV drugs)  He would not like to have an Hepatitis C test today    No results found for: HIAGAB  The patient reports that he has not been screened for HIV   (Screen all 15 to 64 years old)  He would not like to have an HIV test today      Immunization History   Administered Date(s) Administered     HepA-Adult 03/24/2017, 09/29/2017     HepB 06/10/2016, 12/23/2016, 03/24/2017     Influenza Vaccine IM > 6 months Valent IIV4 12/23/2016, 09/29/2017, 10/01/2018, 10/07/2019, 10/12/2020     Pneumococcal 23 valent 05/23/2016     TD (ADULT, 7+) 05/23/2016     Typhoid IM 03/24/2017     The patient's believes that his last tetanus shot was given 5 year(s) ago.   The patient believes that he has not had a Shingrix in the past  The patient believes that he has had a PPSV23 in the past.  The patient believes that he has not had a PCV13 in the past.  The patient believes that he has  had a seasonal flu vaccination this fall or winter.  The patient would like to have a Tdap      No results found for this or any previous visit.]   The patient denies a family history of colon cancer.  The patient reports that he has not had a colonoscopy.    The patient reports that he does not performs a self testicular exam monthly.  His currently used contraception is n/a. He is not interested in a vasectomy in the near future.  The patient denies a family history of prostate cancer.   The patient reports that he eats or drinks 1 servings of dairy products per day. He does not take a calcium supplement at all.  The patient reports that he has not had dental appointments approximately for years   The patient reports that he  has a RetinaVue  approximately every 1.0 year(s).    Do you currently smoke? No  How many years have you smoked?    How many packs per day did you smoke on average? N/A  (if more than 30 pack year history and the patient is age 55-80 consider ordering an annual low dose radiation lung CT to screen for cancer)  (Do not order if patient has quit more than 15 years ago or has a health condition that limits life expectancy or could not tolerate curative lung surgery)  Are you interested having a lung CT to screen for lung cancer? N/A    If the patient has smoked more that 100 cigarettes, has the patient had an imaging study (US or CT) for an AAA between the ages of 65 and 75? N/A          Patient Active Problem List   Diagnosis     HDL deficiency     Type 2 diabetes mellitus without complication, without long-term current use of insulin (H)       History reviewed. No pertinent surgical history.    Family History   Problem Relation Age of Onset     Diabetes Other      Hypertension Other        Social History     Socioeconomic History     Marital status: Single     Spouse name: Not on file     Number of children: Not on file     Years of education: Not on file     Highest education level: Not on  file   Occupational History     Not on file   Tobacco Use     Smoking status: Never Smoker     Smokeless tobacco: Never Used   Substance and Sexual Activity     Alcohol use: Not Currently     Comment: rarely     Drug use: No     Sexual activity: Not Currently     Partners: Female   Other Topics Concern     Parent/sibling w/ CABG, MI or angioplasty before 65F 55M? No   Social History Narrative     Not on file     Social Determinants of Health     Financial Resource Strain:      Difficulty of Paying Living Expenses:    Food Insecurity:      Worried About Running Out of Food in the Last Year:      Ran Out of Food in the Last Year:    Transportation Needs:      Lack of Transportation (Medical):      Lack of Transportation (Non-Medical):    Physical Activity:      Days of Exercise per Week:      Minutes of Exercise per Session:    Stress:      Feeling of Stress :    Social Connections:      Frequency of Communication with Friends and Family:      Frequency of Social Gatherings with Friends and Family:      Attends Anabaptism Services:      Active Member of Clubs or Organizations:      Attends Club or Organization Meetings:      Marital Status:    Intimate Partner Violence:      Fear of Current or Ex-Partner:      Emotionally Abused:      Physically Abused:      Sexually Abused:        Current Outpatient Medications   Medication Sig Dispense Refill     ASPIRIN NOT PRESCRIBED (INTENTIONAL) 1 each continuous prn for other Antiplatelet medication not prescribed intentionally due to not indicated       fexofenadine (ALLEGRA ALLERGY) 180 MG tablet Take 180 mg by mouth daily       STATIN NOT PRESCRIBED (INTENTIONAL) continuous prn for other Statin not prescribed intentionally due to Other   The patient LDL is less than 70 so no statin is indicatted   (This option does not exclude patient from measure)  0     blood glucose monitoring (SHAILESH CONTOUR) test strip Use to test blood sugars 2 times daily or as directed. (Patient not  taking: Reported on 4/12/2021) 100 strip 3     PHYSICAL EXAMINATION:  Blood pressure 118/72, pulse 78, temperature 97.9  F (36.6  C), temperature source Tympanic, weight 94.8 kg (209 lb), SpO2 97 %.  General appearance - healthy, alert and no distress  Skin - Skin color, texture, turgor normal. No rashes or lesions.  Head - Normocephalic. No masses, lesions, tenderness or abnormalities  Eyes - conjunctivae/corneas clear. PERRL, EOM's intact. Fundi benign  Ears - External ears normal. Canals clear. TM's normal.  Nose/Sinuses - Nares normal. Septum midline. Mucosa normal. No drainage or sinus tenderness.  Oropharynx - Lips, mucosa, and tongue normal. Teeth and gums normal.  Neck - Neck supple. No adenopathy. Thyroid symmetric, normal size,  Lungs - Percussion normal. Good diaphragmatic excursion. Lungs clear  Heart - PMI normal. No lifts, heaves, or thrills. RRR. No murmurs, clicks gallops or rub  Abdomen - Abdomen soft, non-tender. BS normal. No masses, organomegaly  Extremities - Extremities normal. No deformities, edema, or skin discoloration.  Musculoskeletal - Spine ROM normal. Muscular strength intact.  Peripheral pulses - radial=4/4, femoral=4/4, popliteal=4/4, dorsalis pedis=4/4,  Neuro - Gait normal. Reflexes normal and symmetric. Sensation grossly WNL.  Genitalia - Penis normal. No urethral discharge. Scrotum normal to palpation. No hernia.  Rectal - deferred      Office Visit on 04/12/2021   Component Date Value Ref Range Status     Hepatitis C Antibody 04/12/2021 Nonreactive  NR^Nonreactive Final    Comment: Assay performance characteristics have not been established for newborns,   infants, and children       Hemoglobin A1C 04/12/2021 5.4  0 - 5.6 % Final    Comment: Normal <5.7% Prediabetes 5.7-6.4%  Diabetes 6.5% or higher - adopted from ADA   consensus guidelines.       Creatinine Urine 04/12/2021 56  mg/dL Final     Albumin Urine mg/L 04/12/2021 <5  mg/L Final     Albumin Urine mg/g Cr 04/12/2021  Unable to calculate due to low value  0 - 17 mg/g Cr Final     Cholesterol 04/12/2021 172  <200 mg/dL Final     Triglycerides 04/12/2021 117  <150 mg/dL Final    Fasting specimen     HDL Cholesterol 04/12/2021 47  >39 mg/dL Final     LDL Cholesterol Calculated 04/12/2021 102* <100 mg/dL Final    Comment: Above desirable:  100-129 mg/dl  Borderline High:  130-159 mg/dL  High:             160-189 mg/dL  Very high:       >189 mg/dl       Non HDL Cholesterol 04/12/2021 125  <130 mg/dL Final       ASSESSMENT:    ICD-10-CM    1. Routine general medical examination at a health care facility  Z00.00        Well-Adult Physical Exam.  Health Maintenance Due   Topic Date Due     ANNUAL REVIEW OF HM ORDERS  Never done     COVID-19 Vaccine (1) Never done     Health Maintenance   Topic Date Due     ANNUAL REVIEW OF HM ORDERS  Never done     COVID-19 Vaccine (1) Never done     INFLUENZA VACCINE (1) 09/01/2021     A1C  10/12/2021     BMP  10/12/2021     DIABETIC FOOT EXAM  10/12/2021     LIPID  04/12/2022     MICROALBUMIN  04/12/2022     EYE EXAM  04/12/2022     PREVENTIVE CARE VISIT  07/19/2022     ADVANCE CARE PLANNING  04/12/2026     DTAP/TDAP/TD IMMUNIZATION (2 - Td or Tdap) 05/23/2026     Pneumococcal Vaccine: Pediatrics (0 to 5 Years) and At-Risk Patients (6 to 64 Years) (2 of 2 - PPSV23) 06/02/2048     HEPATITIS C SCREENING  Completed     PHQ-2  Completed     HEPATITIS B IMMUNIZATION  Completed     HIV SCREENING  Addressed     IPV IMMUNIZATION  Aged Out     MENINGITIS IMMUNIZATION  Aged Out         HEALTH CARE MAINTENENCE: The recommended screening tests and vaccinatons for this patient have been discussed as above.  The appropriate tests and vaccinations  have been ordered or declined by the patient. Please see the orders in EPIC.The patient specifically declines: n/a     Immunization Status:  up to date and documented except for Tdap    Patient Active Problem List   Diagnosis     HDL deficiency     Type 2 diabetes mellitus  without complication, without long-term current use of insulin (H)        ATP III Guidelines  ICSI Preventive Guidelines    PLAN:   Check a fasting lipid profile  HIV testing was discussed but the pt declined  Tdap recommended  Testicular self-exam encouraged  Discussed calcium intake, vitamins and supplements. Recommended 1000 mg of calcium daily  Sunscreen use was recommended especially in the area of tatoos  Recommended dental exams every 6 months  Recommended eye exam every 1 years  Follow up in 1 year for the next preventative medical visit        Body mass index is 28.35 kg/m .

## 2021-07-20 DIAGNOSIS — E11.9 TYPE 2 DIABETES MELLITUS WITHOUT COMPLICATION, WITHOUT LONG-TERM CURRENT USE OF INSULIN (H): Primary | ICD-10-CM

## 2021-07-20 DIAGNOSIS — E78.00 ELEVATED LDL CHOLESTEROL LEVEL: ICD-10-CM

## 2021-07-20 RX ORDER — ATORVASTATIN CALCIUM 10 MG/1
10 TABLET, FILM COATED ORAL AT BEDTIME
Qty: 90 TABLET | Refills: 3 | Status: SHIPPED | OUTPATIENT
Start: 2021-07-20 | End: 2022-06-06

## 2021-08-12 ENCOUNTER — TELEPHONE (OUTPATIENT)
Dept: FAMILY MEDICINE | Facility: CLINIC | Age: 36
End: 2021-08-12

## 2021-08-12 NOTE — TELEPHONE ENCOUNTER
Wondering why his atorvastatin prescription isn't being sent in as a 90 day prescription.  States his pharmacy only giving 30 day prescription.  I did inform him we are sending in as 90 day prescription with refills.  His insurance dictates if given 30 day or 90 day.  Instructed to call pharmacy as to why he is receiving 30 days instead of 90.  States will do so.  Amber Palumbo RN

## 2021-10-09 ENCOUNTER — HEALTH MAINTENANCE LETTER (OUTPATIENT)
Age: 36
End: 2021-10-09

## 2021-10-18 ENCOUNTER — OFFICE VISIT (OUTPATIENT)
Dept: FAMILY MEDICINE | Facility: CLINIC | Age: 36
End: 2021-10-18
Payer: COMMERCIAL

## 2021-10-18 VITALS
WEIGHT: 216 LBS | DIASTOLIC BLOOD PRESSURE: 78 MMHG | OXYGEN SATURATION: 98 % | SYSTOLIC BLOOD PRESSURE: 110 MMHG | BODY MASS INDEX: 29.29 KG/M2 | HEART RATE: 88 BPM | TEMPERATURE: 97.9 F

## 2021-10-18 DIAGNOSIS — E78.6 HDL DEFICIENCY: ICD-10-CM

## 2021-10-18 DIAGNOSIS — E78.00 ELEVATED LDL CHOLESTEROL LEVEL: ICD-10-CM

## 2021-10-18 DIAGNOSIS — E11.9 TYPE 2 DIABETES MELLITUS WITHOUT COMPLICATION, WITHOUT LONG-TERM CURRENT USE OF INSULIN (H): ICD-10-CM

## 2021-10-18 DIAGNOSIS — Z23 NEED FOR PROPHYLACTIC VACCINATION AND INOCULATION AGAINST INFLUENZA: Primary | ICD-10-CM

## 2021-10-18 LAB
ALBUMIN SERPL-MCNC: 4.1 G/DL (ref 3.4–5)
ALP SERPL-CCNC: 81 U/L (ref 40–150)
ALT SERPL W P-5'-P-CCNC: 33 U/L (ref 0–70)
ANION GAP SERPL CALCULATED.3IONS-SCNC: 2 MMOL/L (ref 3–14)
AST SERPL W P-5'-P-CCNC: 20 U/L (ref 0–45)
BILIRUB SERPL-MCNC: 0.6 MG/DL (ref 0.2–1.3)
BUN SERPL-MCNC: 15 MG/DL (ref 7–30)
CALCIUM SERPL-MCNC: 9.2 MG/DL (ref 8.5–10.1)
CHLORIDE BLD-SCNC: 109 MMOL/L (ref 94–109)
CHOLEST SERPL-MCNC: 111 MG/DL
CO2 SERPL-SCNC: 30 MMOL/L (ref 20–32)
CREAT SERPL-MCNC: 1.18 MG/DL (ref 0.66–1.25)
FASTING STATUS PATIENT QL REPORTED: YES
GFR SERPL CREATININE-BSD FRML MDRD: 78 ML/MIN/1.73M2
GLUCOSE BLD-MCNC: 120 MG/DL (ref 70–99)
HBA1C MFR BLD: 5.4 % (ref 0–5.6)
HDLC SERPL-MCNC: 51 MG/DL
LDLC SERPL CALC-MCNC: 45 MG/DL
NONHDLC SERPL-MCNC: 60 MG/DL
POTASSIUM BLD-SCNC: 4 MMOL/L (ref 3.4–5.3)
PROT SERPL-MCNC: 7.1 G/DL (ref 6.8–8.8)
SODIUM SERPL-SCNC: 141 MMOL/L (ref 133–144)
TRIGL SERPL-MCNC: 76 MG/DL

## 2021-10-18 PROCEDURE — 83036 HEMOGLOBIN GLYCOSYLATED A1C: CPT | Performed by: FAMILY MEDICINE

## 2021-10-18 PROCEDURE — 90471 IMMUNIZATION ADMIN: CPT | Performed by: FAMILY MEDICINE

## 2021-10-18 PROCEDURE — 80053 COMPREHEN METABOLIC PANEL: CPT | Performed by: FAMILY MEDICINE

## 2021-10-18 PROCEDURE — 90686 IIV4 VACC NO PRSV 0.5 ML IM: CPT | Performed by: FAMILY MEDICINE

## 2021-10-18 PROCEDURE — 80061 LIPID PANEL: CPT | Performed by: FAMILY MEDICINE

## 2021-10-18 PROCEDURE — 36415 COLL VENOUS BLD VENIPUNCTURE: CPT | Performed by: FAMILY MEDICINE

## 2021-10-18 PROCEDURE — 99214 OFFICE O/P EST MOD 30 MIN: CPT | Mod: 25 | Performed by: FAMILY MEDICINE

## 2021-10-18 PROCEDURE — 99207 PR FOOT EXAM NO CHARGE: CPT | Performed by: FAMILY MEDICINE

## 2021-10-18 ASSESSMENT — PAIN SCALES - GENERAL: PAINLEVEL: NO PAIN (0)

## 2021-10-18 NOTE — PROGRESS NOTES
[facility] diabetes resourses:  [email or web address]        To access diabetes educational materials with in EPIC use <dot>ARJUN      Put this in AFTER VISIT SUMMARY if needed for the patient to access information online [email or web address]      SUBJECTIVE:  Jean Lamar is a 36 year old male who presents today for a follow up appointment for management of DIABETES MELLITUS.    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       The patient checks his blood sugar as follows: never, once daily.      The patient reports that he IS taking the medication as prescribed.   ----------------------------------------------------------------------------------------------------------------------------------------    BP Readings from Last 3 Encounters:   10/18/21 110/78   07/19/21 118/72   04/12/21 (!) 116/93     The patient reports that he IS NOT currently smoking.  History   Smoking Status     Never Smoker   Smokeless Tobacco     Never Used           The ASCVD Risk score ([place] Jr., et al., 2013) failed to calculate for the following reasons:    The 2013 ASCVD risk score is only valid for ages 40 to 79        Current Outpatient Medications   Medication Sig Dispense Refill     ASPIRIN NOT PRESCRIBED (INTENTIONAL) 1 each continuous prn for other Antiplatelet medication not prescribed intentionally due to not indicated       atorvastatin (LIPITOR) 10 MG tablet Take 1 tablet (10 mg) by mouth At Bedtime 90 tablet 3     blood glucose monitoring (SHAILESH CONTOUR) test strip Use to test blood sugars 2 times daily or as  directed. 100 strip 3     fexofenadine (ALLEGRA ALLERGY) 180 MG tablet Take 180 mg by mouth daily       STATIN NOT PRESCRIBED (INTENTIONAL) continuous prn for other Statin not prescribed intentionally due to Other   The patient LDL is less than 70 so no statin is indicatted   (This option does not exclude patient from measure)  0       The patient reports that he IS taking a statin.   (Reminder all diabetics with a ASCVD risk greater or equal to 7.5% should be on a high intensity statin, otherwise on a moderate intensity statin)      The patient reports that he IS NOT taking  aspirin daily.  (Reminder all diabetic patients with a cardiovascular risk factor and > 50 should take a daily aspirin)   he is not taking aspirin because it is not indicated at his age..  (Reminder to intentionally not prescribe aspirin in )    The patient reports that he IS doing a self foot exam monthly.  The patient reports that he does exercise in the form of walking at work.   The patient reports that he IS following the recommended diabetic diet. He  would give himself a C+ grade on his diet.  The patient reports that his last eye exam was 6 months ago.         Immunization History   Administered Date(s) Administered     HepA-Adult 03/24/2017, 09/29/2017     HepB 06/10/2016, 12/23/2016, 03/24/2017     Influenza Vaccine IM > 6 months Valent IIV4 (Alfuria,Fluzone) 12/23/2016, 09/29/2017, 10/01/2018, 10/07/2019, 10/12/2020     Pneumococcal 23 valent 05/23/2016     TD (ADULT, 7+) 05/23/2016     Tdap (Adacel,Boostrix) 07/19/2021     Typhoid IM 03/24/2017     The patient reports that he has had the hepatitis B vaccine series in the past. (recommended for age 19-59 and can be given to age 60 or older)  The patient reports that he has had a pnuemovax in the past.  The patient reports that he has not had a flu shot for the current influenza season.  The patient would like to have a Influenza            EXAM:  /78   Pulse 88    Temp 97.9  F (36.6  C) (Tympanic)   Wt 98 kg (216 lb)   SpO2 98%   BMI 29.29 kg/m    Wt Readings from Last 4 Encounters:   10/18/21 98 kg (216 lb)   07/19/21 94.8 kg (209 lb)   04/12/21 98.9 kg (218 lb)   10/12/20 99.3 kg (219 lb)     Body mass index is 29.29 kg/m .    General:  Jean is awake, alert, and cooperative.  NAD.      Diabetic Foot Screen:  Any complaints of increased pain or numbness ? No  Is there a foot ulcer now or a history of foot ulcer? No  Does the foot have an abnormal shape? No  Are the nails thick, too long or ingrown? No  Are there any redness or open areas? No         Sensation Testing done at all points on the diagram with monofilament     Right Foot: Sensation Normal at all points  Left Foot: Sensation Normal at all points     Risk Category: 0- No loss of protective sensation  Performed by Kilo Montes MD        Diabetic foot exam: normal DP and PT pulses, no trophic changes or ulcerative lesions and normal sensory exam            Previsit labs drawn include:  Office Visit on 07/19/2021   Component Date Value Ref Range Status     Cholesterol 07/19/2021 180  <200 mg/dL Final    Age 0-19 years  Desirable: <170 mg/dL  Borderline high:  170-199 mg/dl  High:            >199 mg/dl    Age 20 years and older  Desirable: <200 mg/dL     Triglycerides 07/19/2021 64  <150 mg/dL Final    0-9 years:  Normal:    Less than 75 mg/dL  Borderline high:  75-99 mg/dL  High:             Greater than or equal to 100 mg/dL    0-19 years:  Normal:    Less than 90 mg/dL  Borderline high:   mg/dL  High:             Greater than or equal to 130 mg/dL    20 years and older:  Normal:    Less than 150 mg/dL  Borderline high:  150-199 mg/dL  High:             200-499 mg/dL  Very high:   Greater than or equal to 500 mg/dL     Direct Measure HDL 07/19/2021 56  >=40 mg/dL Final    0-19 years:       Greater than or equal to 45 mg/dL   Low: Less than 40 mg/dL   Borderline low: 40-44 mg/dL     20 years and older:    Female: Greater than or equal to 50 mg/dL   Male:   Greater than or equal to 40 mg/dL          LDL Cholesterol Calculated 07/19/2021 111* <=100 mg/dL Final    Age 0-19 years:  Desirable: 0-110 mg/dL   Borderline high: 110-129 mg/dL   High: >= 130 mg/dL    Age 20 years and older:  Desirable: <100mg/dL  Above desirable: 100-129 mg/dL   Borderline high: 130-159 mg/dL   High: 160-189 mg/dL   Very high: >= 190 mg/dL     Non HDL Cholesterol 07/19/2021 124  <130 mg/dL Final    0-19 years:  Desirable:          Less than 120 mg/dL  Borderline high:   120-144 mg/dL  High:                   Greater than or equal to 145 mg/dL    20 years and older:  Desirable:          130 mg/dL  Above Desirable: 130-159 mg/dL  Borderline high:   160-189 mg/dL  High:               190-219 mg/dL  Very high:     Greater than or equal to 220 mg/dL     Patient Fasting > 8hrs? 07/19/2021 Yes   Final         ASSESSMENT and PLAN:    Type II Diabetes, .    DIABETES Type II:                       Lab Results   Component Value Date    A1C 5.4 04/12/2021       Control   unable to assess  Lab Results   Component Value Date    A1C 5.4 04/12/2021    A1C 5.4 10/12/2020    A1C 5.5 04/20/2020       Albumin Urine mg/g Cr   Date Value Ref Range Status   04/12/2021 Unable to calculate due to low value 0 - 17 mg/g Cr Final         Check a HGBA1C , Check a Creatinine/GFR, Compliance with the recommended diabetic diet was stressed, Regular aerobic exercise was encouraged, Home glucose monitoring encouraged, Annual eye exam recommended, Flu vaccine recommended, Self foot exam demonstrated and recommended to be done nightly, Apply moisturizer to feet with in 3 minutes of showering or bathing, Follow up in clinic in 3-6 months for a diabetes check and Future labs ordered and the patient was instructed to make a lab appt 1 week prior to next diabetes visit      BP/HTN:   BP Readings from Last 3 Encounters:   10/18/21 110/78   07/19/21 118/72   04/12/21 (!) 116/93  "    Control   good    - Medication:N/A  (make sure to order \"Ace not prescribed intentionally if not on an ACE/ARB)  The patient was advised to do the following therapuetic life style changes  - Dietary sodium restriction and increase potassium and Calcium intake  - Regular aerobic exercise  - Weight loss  - Discontinue smoking if applicable  - Avoid regular NSAID use if applicable  - Avoid regular decongestant use if applicable  - Follow up in clinic in 6 months for a recheck  - Check a basic metabolic panel today if needed    Patient Education: Reviewed risks of hypertension and principles of   Treatment.    HYPERCHOLESTEROLEMIA:     The ASCVD Risk score (Edin FISCHER Jr., et al., 2013) failed to calculate for the following reasons:    The 2013 ASCVD risk score is only valid for ages 40 to 79    Lab Results   Component Value Date     07/19/2021     04/12/2021      Control   unable to assess, he was started on the atorvastatin after this fasting lipid panel was taken  Cholesterol   Date Value Ref Range Status   07/19/2021 180 <200 mg/dL Final     Comment:     Age 0-19 years  Desirable: <170 mg/dL  Borderline high:  170-199 mg/dl  High:            >199 mg/dl    Age 20 years and older  Desirable: <200 mg/dL   04/12/2021 172 <200 mg/dL Final   10/12/2020 143 <200 mg/dL Final     HDL Cholesterol   Date Value Ref Range Status   04/12/2021 47 >39 mg/dL Final   10/12/2020 47 >39 mg/dL Final     Direct Measure HDL   Date Value Ref Range Status   07/19/2021 56 >=40 mg/dL Final     Comment:     0-19 years:       Greater than or equal to 45 mg/dL   Low: Less than 40 mg/dL   Borderline low: 40-44 mg/dL     20 years and older:   Female: Greater than or equal to 50 mg/dL   Male:   Greater than or equal to 40 mg/dL          LDL Cholesterol Calculated   Date Value Ref Range Status   07/19/2021 111 (H) <=100 mg/dL Final     Comment:     Age 0-19 years:  Desirable: 0-110 mg/dL   Borderline high: 110-129 mg/dL   High: >= 130 " mg/dL    Age 20 years and older:  Desirable: <100mg/dL  Above desirable: 100-129 mg/dL   Borderline high: 130-159 mg/dL   High: 160-189 mg/dL   Very high: >= 190 mg/dL   04/12/2021 102 (H) <100 mg/dL Final     Comment:     Above desirable:  100-129 mg/dl  Borderline High:  130-159 mg/dL  High:             160-189 mg/dL  Very high:       >189 mg/dl     10/12/2020 71 <100 mg/dL Final     Comment:     Desirable:       <100 mg/dl     Triglycerides   Date Value Ref Range Status   07/19/2021 64 <150 mg/dL Final     Comment:     0-9 years:  Normal:    Less than 75 mg/dL  Borderline high:  75-99 mg/dL  High:             Greater than or equal to 100 mg/dL    0-19 years:  Normal:    Less than 90 mg/dL  Borderline high:   mg/dL  High:             Greater than or equal to 130 mg/dL    20 years and older:  Normal:    Less than 150 mg/dL  Borderline high:  150-199 mg/dL  High:             200-499 mg/dL  Very high:   Greater than or equal to 500 mg/dL   04/12/2021 117 <150 mg/dL Final     Comment:     Fasting specimen   10/12/2020 125 <150 mg/dL Final     Comment:     Fasting specimen     No results found for: CHOLHDLRATIO      The patient is on a moderate intensity statin     The patient is fasting and we will recheck the fasting lipid panel .

## 2021-10-18 NOTE — NURSING NOTE
Chief Complaint   Patient presents with     Diabetes       Initial BP (!) 145/80   Pulse 88   Temp 97.9  F (36.6  C) (Tympanic)   Wt 98 kg (216 lb)   SpO2 98%   BMI 29.29 kg/m   Estimated body mass index is 29.29 kg/m  as calculated from the following:    Height as of 4/12/21: 1.829 m (6').    Weight as of this encounter: 98 kg (216 lb).  Medication Reconciliation: complete  Alda Mar, CMA

## 2022-04-11 ENCOUNTER — OFFICE VISIT (OUTPATIENT)
Dept: FAMILY MEDICINE | Facility: CLINIC | Age: 37
End: 2022-04-11
Payer: COMMERCIAL

## 2022-04-11 VITALS
WEIGHT: 216 LBS | HEIGHT: 72 IN | SYSTOLIC BLOOD PRESSURE: 120 MMHG | RESPIRATION RATE: 18 BRPM | HEART RATE: 78 BPM | BODY MASS INDEX: 29.26 KG/M2 | OXYGEN SATURATION: 96 % | DIASTOLIC BLOOD PRESSURE: 77 MMHG | TEMPERATURE: 97.2 F

## 2022-04-11 DIAGNOSIS — E11.9 TYPE 2 DIABETES MELLITUS WITHOUT COMPLICATION, WITHOUT LONG-TERM CURRENT USE OF INSULIN (H): Primary | ICD-10-CM

## 2022-04-11 LAB — HBA1C MFR BLD: 5.6 % (ref 0–5.6)

## 2022-04-11 PROCEDURE — 36415 COLL VENOUS BLD VENIPUNCTURE: CPT | Performed by: FAMILY MEDICINE

## 2022-04-11 PROCEDURE — 99213 OFFICE O/P EST LOW 20 MIN: CPT | Performed by: FAMILY MEDICINE

## 2022-04-11 PROCEDURE — 83036 HEMOGLOBIN GLYCOSYLATED A1C: CPT | Performed by: FAMILY MEDICINE

## 2022-04-11 PROCEDURE — 84443 ASSAY THYROID STIM HORMONE: CPT | Performed by: FAMILY MEDICINE

## 2022-04-11 PROCEDURE — 82043 UR ALBUMIN QUANTITATIVE: CPT | Performed by: FAMILY MEDICINE

## 2022-04-11 ASSESSMENT — PAIN SCALES - GENERAL: PAINLEVEL: NO PAIN (0)

## 2022-04-11 NOTE — RESULT ENCOUNTER NOTE
Jean,  I have reviewed the results of the laboratory tests that we recently ordered. All of the laboratory that are back so far are normal or considered normal for you. There are still some labs pending and I will let you know those results when they   are available.  Sincerely,   Kilo Montes MD

## 2022-04-11 NOTE — PROGRESS NOTES
[facility] diabetes resourses:  [email or web address]        To access diabetes educational materials with in EPIC use <dot>ARJUN      Put this in AFTER VISIT SUMMARY if needed for the patient to access information online [email or web address]      SUBJECTIVE:  Jean Lamar is a 36 year old male who presents today for a follow up appointment for management of DIABETES MELLITUS.    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  Yes     The patient checks his blood sugar as follows: never, once daily.      The patient reports that he IS taking the medication as prescribed. He denies side effects of medication.    ----------------------------------------------------------------------------------------------------------------------------------------    BP Readings from Last 3 Encounters:   04/11/22 133/87   10/18/21 110/78   07/19/21 118/72     The patient reports that he IS NOT currently smoking.  History   Smoking Status     Never Smoker   Smokeless Tobacco     Never Used         The ASCVD Risk score ([place] AALIYAH Jr., et al., 2013) failed to calculate for the following reasons:    The 2013 ASCVD risk score is only valid for ages 40 to 79        Current Outpatient Medications   Medication Sig Dispense Refill     ASPIRIN NOT PRESCRIBED (INTENTIONAL) 1 each continuous prn for other Antiplatelet medication not prescribed intentionally due to not indicated       atorvastatin (LIPITOR) 10 MG tablet Take 1 tablet (10 mg) by mouth At Bedtime 90 tablet 3     blood glucose monitoring (SHAILESH CONTOUR) test strip Use to  test blood sugars 2 times daily or as directed. 100 strip 3     fexofenadine (ALLEGRA) 180 MG tablet Take 180 mg by mouth daily       STATIN NOT PRESCRIBED (INTENTIONAL) continuous prn for other Statin not prescribed intentionally due to Other   The patient LDL is less than 70 so no statin is indicatted   (This option does not exclude patient from measure)  0       The patient reports that he IS taking a statin.   (Reminder all diabetics with a ASCVD risk greater or equal to 7.5% should be on a high intensity statin, otherwise on a moderate intensity statin)      The patient reports that he IS NOT taking aspirin daily.  (Reminder all diabetic patients with a cardiovascular risk factor and > 50 should take a daily aspirin)   he is not taking aspirin because it is not indicated at his age.  (Reminder to intentionally not prescribe aspirin in )    The patient reports that he IS doing a self foot exam very infrequently.  The patient reports that he does not exercise in the form of a lot of walking on  on the job    The patient reports that he IS following the recommended diabetic diet. He  would give himself a C grade on his diet.  The patient reports that his last eye exam was 1 years ago.       Immunization History   Administered Date(s) Administered     HepA-Adult 03/24/2017, 09/29/2017     HepB 06/10/2016, 12/23/2016, 03/24/2017     Influenza Vaccine IM > 6 months Valent IIV4 (Alfuria,Fluzone) 12/23/2016, 09/29/2017, 10/01/2018, 10/07/2019, 10/12/2020, 10/18/2021     Pneumococcal 23 valent 05/23/2016     TD (ADULT, 7+) 05/23/2016     Tdap (Adacel,Boostrix) 07/19/2021     Typhoid IM 03/24/2017     The patient reports that he has had the hepatitis B vaccine series in the past. (recommended for age 19-59 and can be given to age 60 or older)  The patient reports that he has had a pnuemovax in the past.  The patient reports that he has had a flu shot for the current influenza season.  The patient would like to  have a no vaccinations today    Patient concerns: has no specific complaints and has been feeling well.        EXAM:  /87   Pulse 78   Temp 97.2  F (36.2  C) (Tympanic)   Resp 18   Ht 1.829 m (6')   Wt 98 kg (216 lb)   SpO2 96%   BMI 29.29 kg/m    Wt Readings from Last 4 Encounters:   04/11/22 98 kg (216 lb)   10/18/21 98 kg (216 lb)   07/19/21 94.8 kg (209 lb)   04/12/21 98.9 kg (218 lb)     Body mass index is 29.29 kg/m .    General:  Jean is awake, alert, and cooperative.  NAD.      Diabetic Foot Screen:  Any complaints of increased pain or numbness ? No  Is there a foot ulcer now or a history of foot ulcer? No  Does the foot have an abnormal shape? No  Are the nails thick, too long or ingrown? No  Are there any redness or open areas? No         Sensation Testing done at all points on the diagram with monofilament     Right Foot: Sensation Normal at all points  Left Foot: Sensation Normal at all points     Risk Category: 0- No loss of protective sensation  Performed by Kilo Montes MD        Diabetic foot exam: normal DP and PT pulses, no trophic changes or ulcerative lesions and normal sensory exam            Previsit labs drawn include:  Office Visit on 10/18/2021   Component Date Value Ref Range Status     Cholesterol 10/18/2021 111  <200 mg/dL Final     Triglycerides 10/18/2021 76  <150 mg/dL Final     Direct Measure HDL 10/18/2021 51  >=40 mg/dL Final     LDL Cholesterol Calculated 10/18/2021 45  <=100 mg/dL Final     Non HDL Cholesterol 10/18/2021 60  <130 mg/dL Final     Patient Fasting > 8hrs? 10/18/2021 Yes   Final     Hemoglobin A1C 10/18/2021 5.4  0.0 - 5.6 % Final    Normal <5.7%   Prediabetes 5.7-6.4%    Diabetes 6.5% or higher     Note: Adopted from ADA consensus guidelines.     Sodium 10/18/2021 141  133 - 144 mmol/L Final     Potassium 10/18/2021 4.0  3.4 - 5.3 mmol/L Final     Chloride 10/18/2021 109  94 - 109 mmol/L Final     Carbon Dioxide (CO2) 10/18/2021 30  20 - 32 mmol/L  Final     Anion Gap 10/18/2021 2 (A) 3 - 14 mmol/L Final     Urea Nitrogen 10/18/2021 15  7 - 30 mg/dL Final     Creatinine 10/18/2021 1.18  0.66 - 1.25 mg/dL Final     Calcium 10/18/2021 9.2  8.5 - 10.1 mg/dL Final     Glucose 10/18/2021 120 (A) 70 - 99 mg/dL Final     Alkaline Phosphatase 10/18/2021 81  40 - 150 U/L Final     AST 10/18/2021 20  0 - 45 U/L Final     ALT 10/18/2021 33  0 - 70 U/L Final     Protein Total 10/18/2021 7.1  6.8 - 8.8 g/dL Final     Albumin 10/18/2021 4.1  3.4 - 5.0 g/dL Final     Bilirubin Total 10/18/2021 0.6  0.2 - 1.3 mg/dL Final     GFR Estimate 10/18/2021 78  >60 mL/min/1.73m2 Final    As of July 11, 2021, eGFR is calculated by the CKD-EPI creatinine equation, without race adjustment. eGFR can be influenced by muscle mass, exercise, and diet. The reported eGFR is an estimation only and is only applicable if the renal function is stable.         ASSESSMENT and PLAN:    Type II Diabetes,.    DIABETES Type II:                       Lab Results   Component Value Date    A1C 5.4 10/18/2021    A1C 5.4 04/12/2021       Control   unable to assess  Lab Results   Component Value Date    A1C 5.4 10/18/2021    A1C 5.4 04/12/2021    A1C 5.4 10/12/2020    A1C 5.5 04/20/2020       Albumin Urine mg/g Cr   Date Value Ref Range Status   04/12/2021 Unable to calculate due to low value 0 - 17 mg/g Cr Final         Check a HGBA1C , Recommended to take ASA  mg daily for appropriate patient, Compliance with the recommended diabetic diet was stressed, Regular aerobic exercise was encouraged, Home glucose monitoring encouraged, Annual eye exam recommended, Self foot exam demonstrated and recommended to be done nightly, Apply moisturizer to feet with in 3 minutes of showering or bathing, Follow up in clinic in 3 months for a diabetes check and Future labs ordered and the patient was instructed to make a lab appt 1 week prior to next diabetes visit      BP/HTN:   BP Readings from Last 3 Encounters:  "  04/11/22 120/77   10/18/21 110/78   07/19/21 118/72     Control   good    - Medication:continue the current doses of medication.  (make sure to order \"Ace not prescribed intentionally if not on an ACE/ARB)  The patient was advised to do the following therapuetic life style changes  - Dietary sodium restriction and increase potassium and Calcium intake  - Regular aerobic exercise  - Weight loss  - Discontinue smoking if applicable  - Avoid regular NSAID use if applicable  - Avoid regular decongestant use if applicable  - Follow up in clinic in 6 months for a recheck  - Check a basic metabolic panel today if needed    Patient Education: Reviewed risks of hypertension and principles of   Treatment.    HYPERCHOLESTEROLEMIA:     The ASCVD Risk score ([place] FISCHER Jr., et al., 2013) failed to calculate for the following reasons:    The 2013 ASCVD risk score is only valid for ages 40 to 79    Lab Results   Component Value Date    LDL 45 10/18/2021     04/12/2021      Control   good  Cholesterol   Date Value Ref Range Status   10/18/2021 111 <200 mg/dL Final   07/19/2021 180 <200 mg/dL Final     Comment:     Age 0-19 years  Desirable: <170 mg/dL  Borderline high:  170-199 mg/dl  High:            >199 mg/dl    Age 20 years and older  Desirable: <200 mg/dL   04/12/2021 172 <200 mg/dL Final   10/12/2020 143 <200 mg/dL Final     HDL Cholesterol   Date Value Ref Range Status   04/12/2021 47 >39 mg/dL Final   10/12/2020 47 >39 mg/dL Final     Direct Measure HDL   Date Value Ref Range Status   10/18/2021 51 >=40 mg/dL Final   07/19/2021 56 >=40 mg/dL Final     Comment:     0-19 years:       Greater than or equal to 45 mg/dL   Low: Less than 40 mg/dL   Borderline low: 40-44 mg/dL     20 years and older:   Female: Greater than or equal to 50 mg/dL   Male:   Greater than or equal to 40 mg/dL          LDL Cholesterol Calculated   Date Value Ref Range Status   10/18/2021 45 <=100 mg/dL Final   07/19/2021 111 (H) <=100 mg/dL Final "     Comment:     Age 0-19 years:  Desirable: 0-110 mg/dL   Borderline high: 110-129 mg/dL   High: >= 130 mg/dL    Age 20 years and older:  Desirable: <100mg/dL  Above desirable: 100-129 mg/dL   Borderline high: 130-159 mg/dL   High: 160-189 mg/dL   Very high: >= 190 mg/dL   04/12/2021 102 (H) <100 mg/dL Final     Comment:     Above desirable:  100-129 mg/dl  Borderline High:  130-159 mg/dL  High:             160-189 mg/dL  Very high:       >189 mg/dl     10/12/2020 71 <100 mg/dL Final     Comment:     Desirable:       <100 mg/dl     Triglycerides   Date Value Ref Range Status   10/18/2021 76 <150 mg/dL Final   07/19/2021 64 <150 mg/dL Final     Comment:     0-9 years:  Normal:    Less than 75 mg/dL  Borderline high:  75-99 mg/dL  High:             Greater than or equal to 100 mg/dL    0-19 years:  Normal:    Less than 90 mg/dL  Borderline high:   mg/dL  High:             Greater than or equal to 130 mg/dL    20 years and older:  Normal:    Less than 150 mg/dL  Borderline high:  150-199 mg/dL  High:             200-499 mg/dL  Very high:   Greater than or equal to 500 mg/dL   04/12/2021 117 <150 mg/dL Final     Comment:     Fasting specimen   10/12/2020 125 <150 mg/dL Final     Comment:     Fasting specimen     No results found for: CHOLHDLRATIO      The patient is on a moderate intensity statin and this is the appropriate treatment based on the patient's LDL being less than 70 so no statin is indicated at this time.    The patient's HDL is normal  The patient's triglycerides are normal.    We have discussed the results and we will continue the current management.             Answers for HPI/ROS submitted by the patient on 4/11/2022  Frequency of checking blood sugars:: not at all  Diabetic concerns:: none  Paraesthesia present:: none of these symptoms  Have you had a diabetic eye exam within the last year?: No  How many servings of fruits and vegetables do you eat daily?: 0-1  On average, how many sweetened  beverages do you drink each day (Examples: soda, juice, sweet tea, etc.  Do NOT count diet or artificially sweetened beverages)?: 0  How many minutes a day do you exercise enough to make your heart beat faster?: 10 to 19  How many days a week do you exercise enough to make your heart beat faster?: 3 or less  How many days per week do you miss taking your medication?: 0

## 2022-04-11 NOTE — NURSING NOTE
Chief Complaint   Patient presents with     Diabetes       Initial There were no vitals taken for this visit. Estimated body mass index is 29.29 kg/m  as calculated from the following:    Height as of 4/12/21: 1.829 m (6').    Weight as of 10/18/21: 98 kg (216 lb).  Medication Reconciliation: complete  Alda Mar, CMA

## 2022-04-11 NOTE — NURSING NOTE
Chief Complaint   Patient presents with     Diabetes       Initial /87   Pulse 78   Temp 97.2  F (36.2  C) (Tympanic)   Resp 18   Ht 1.829 m (6')   Wt 98 kg (216 lb)   SpO2 96%   BMI 29.29 kg/m   Estimated body mass index is 29.29 kg/m  as calculated from the following:    Height as of this encounter: 1.829 m (6').    Weight as of this encounter: 98 kg (216 lb).  Medication Reconciliation: complete  Alda Mar, CMA

## 2022-04-12 LAB
CREAT UR-MCNC: 262 MG/DL
MICROALBUMIN UR-MCNC: 17 MG/L
MICROALBUMIN/CREAT UR: 6.49 MG/G CR (ref 0–17)
TSH SERPL DL<=0.005 MIU/L-ACNC: 1.25 MU/L (ref 0.4–4)

## 2022-04-22 NOTE — PATIENT INSTRUCTIONS
No glasses advised  No signs of diabetic retinopathy  Keep blood sugar under good control  Return in 1 year for diabetic eye exam      Blood sugar and blood pressure control are very important in the prevention of ocular complications from diabetes.       Deysi Thomas, OD  [phone]

## 2022-04-25 ENCOUNTER — OFFICE VISIT (OUTPATIENT)
Dept: OPTOMETRY | Facility: CLINIC | Age: 37
End: 2022-04-25
Payer: COMMERCIAL

## 2022-04-25 DIAGNOSIS — H52.222 REGULAR ASTIGMATISM OF LEFT EYE: ICD-10-CM

## 2022-04-25 DIAGNOSIS — E11.9 TYPE 2 DIABETES MELLITUS WITHOUT COMPLICATION, WITHOUT LONG-TERM CURRENT USE OF INSULIN (H): ICD-10-CM

## 2022-04-25 DIAGNOSIS — E11.9 TYPE 2 DIABETES MELLITUS WITHOUT COMPLICATION, WITHOUT LONG-TERM CURRENT USE OF INSULIN (H): Primary | ICD-10-CM

## 2022-04-25 PROCEDURE — 92004 COMPRE OPH EXAM NEW PT 1/>: CPT | Performed by: OPTOMETRIST

## 2022-04-25 PROCEDURE — 92015 DETERMINE REFRACTIVE STATE: CPT | Performed by: OPTOMETRIST

## 2022-04-25 ASSESSMENT — CONF VISUAL FIELD
OD_NORMAL: 1
METHOD: COUNTING FINGERS
OS_NORMAL: 1

## 2022-04-25 ASSESSMENT — KERATOMETRY
OS_K1POWER_DIOPTERS: 44.00
OS_AXISANGLE2_DEGREES: 1
OS_K2POWER_DIOPTERS: 43.75
OD_AXISANGLE2_DEGREES: 176
OD_K2POWER_DIOPTERS: 43.75
OD_K1POWER_DIOPTERS: 44.00

## 2022-04-25 ASSESSMENT — REFRACTION_MANIFEST
OD_SPHERE: +0.25
OS_SPHERE: +0.75
OS_AXIS: 168
OS_SPHERE: +0.50
METHOD_AUTOREFRACTION: 1
OD_CYLINDER: +0.25
OD_AXIS: 031
OS_CYLINDER: +0.25
OS_AXIS: 160
OD_SPHERE: +0.25
OS_CYLINDER: +0.50
OD_CYLINDER: SPHERE

## 2022-04-25 ASSESSMENT — TONOMETRY
IOP_UNABLETOASSESS: 1
OS_IOP_MMHG: 20
IOP_METHOD: APPLANATION

## 2022-04-25 ASSESSMENT — VISUAL ACUITY
METHOD: SNELLEN - LINEAR
OD_SC+: -1
OS_SC: 20/20-1
OS_SC: 20/20
OD_SC: 20/20-1
OD_SC: 20/20

## 2022-04-25 ASSESSMENT — CUP TO DISC RATIO
OD_RATIO: 0.5
OS_RATIO: 0.5

## 2022-04-25 NOTE — LETTER
4/25/2022         RE: Jean J Willard  [address] [facility] [id number]        Dear Colleague,    Thank you for referring your patient, Jean Lamar, to the [facility]. No diabetic retinopathy was found at eye exam.   Please see a copy of my visit note below.    Chief Complaint   Patient presents with     Diabetic Eye Exam     Dr Montes advised eye exam due to diabetes      Chief Complaint(s) and History of Present Illness(es)     Diabetic Eye Exam     Vision: is stable    Diabetes Type: Type 2 and controlled with diet    Blood Sugars: is controlled               Lab Results   Component Value Date    A1C 5.6 04/11/2022    A1C 5.4 10/18/2021    A1C 5.4 04/12/2021    A1C 5.4 10/12/2020    A1C 5.5 04/20/2020    A1C 5.3 10/07/2019    A1C 5.5 03/04/2019            Last Eye Exam: 2017/18, Did have the Retina Camera here in Dr Montes's office last year   Dilated Previously: Yes    What are you currently using to see?  does not use glasses or contacts    Distance Vision Acuity: Satisfied with vision    Near Vision Acuity: Satisfied with vision while reading and using computer unaided    Eye Comfort: good  Do you use eye drops? 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       OBJECTIVE: See Ophthalmology exam    ASSESSMENT:    ICD-10-CM    1. Type 2 diabetes mellitus without complication, without long-term current use of insulin (H)  E11.9     no ocular diabetic retinopathy    2. Regular astigmatism of left eye  H52.222        PLAN:    Jean Lamar aware  eye exam results will be sent to Kilo Montes  Patient Instructions   No glasses advised  No signs of diabetic retinopathy  Keep blood sugar under good control  Return in 1 year for diabetic eye exam      Blood sugar and blood pressure control are very important in the prevention of  ocular complications from diabetes.       Deysi Thomas, OD  [phone]                        Again, thank you for allowing me to participate in the care of your patient.        Sincerely,        Deysi Thomas, OD

## 2022-04-25 NOTE — PROGRESS NOTES
Chief Complaint   Patient presents with     Diabetic Eye Exam     Dr Montes advised eye exam due to diabetes      Chief Complaint(s) and History of Present Illness(es)     Diabetic Eye Exam     Vision: is stable    Diabetes Type: Type 2 and controlled with diet    Blood Sugars: is controlled               Lab Results   Component Value Date    A1C 5.6 04/11/2022    A1C 5.4 10/18/2021    A1C 5.4 04/12/2021    A1C 5.4 10/12/2020    A1C 5.5 04/20/2020    A1C 5.3 10/07/2019    A1C 5.5 03/04/2019            Last Eye Exam: 2017/18, Did have the Retina Camera here in Dr Montes's office last year   Dilated Previously: Yes    What are you currently using to see?  does not use glasses or contacts    Distance Vision Acuity: Satisfied with vision    Near Vision Acuity: Satisfied with vision while reading and using computer unaided    Eye Comfort: good  Do you use eye drops? 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       OBJECTIVE: See Ophthalmology exam    ASSESSMENT:    ICD-10-CM    1. Type 2 diabetes mellitus without complication, without long-term current use of insulin (H)  E11.9     no ocular diabetic retinopathy    2. Regular astigmatism of left eye  H52.222        PLAN:    Jean Lamar aware  eye exam results will be sent to Kilo Montes  Patient Instructions   No glasses advised  No signs of diabetic retinopathy  Keep blood sugar under good control  Return in 1 year for diabetic eye exam      Blood sugar and blood pressure control are very important in the prevention of ocular complications from diabetes.       Deysi Thomas, OD  [phone]

## 2022-04-29 NOTE — RESULT ENCOUNTER NOTE
Jean,  I have reviewed the results of the laboratory tests that we recently ordered. All of the lab work performed was normal or considered normal for you.  Sincerely,   Kilo Montes MD      
Jean,  I have reviewed the results of the laboratory tests that we recently ordered. All of the laboratory that are back so far are normal or considered normal for you. There are still some labs pending and I will let you know those results when they   are available.  Sincerely,   Kilo Montes MD      
Yes

## 2022-09-11 ENCOUNTER — HEALTH MAINTENANCE LETTER (OUTPATIENT)
Age: 37
End: 2022-09-11

## 2023-01-22 ENCOUNTER — HEALTH MAINTENANCE LETTER (OUTPATIENT)
Age: 38
End: 2023-01-22

## 2023-07-29 ENCOUNTER — HEALTH MAINTENANCE LETTER (OUTPATIENT)
Age: 38
End: 2023-07-29

## 2023-10-07 ENCOUNTER — HEALTH MAINTENANCE LETTER (OUTPATIENT)
Age: 38
End: 2023-10-07

## 2024-02-24 ENCOUNTER — HEALTH MAINTENANCE LETTER (OUTPATIENT)
Age: 39
End: 2024-02-24

## 2024-09-21 ENCOUNTER — HEALTH MAINTENANCE LETTER (OUTPATIENT)
Age: 39
End: 2024-09-21

## 2024-11-30 ENCOUNTER — HEALTH MAINTENANCE LETTER (OUTPATIENT)
Age: 39
End: 2024-11-30

## 2025-04-05 ENCOUNTER — HEALTH MAINTENANCE LETTER (OUTPATIENT)
Age: 40
End: 2025-04-05